# Patient Record
Sex: FEMALE | Race: WHITE | NOT HISPANIC OR LATINO | Employment: OTHER | URBAN - METROPOLITAN AREA
[De-identification: names, ages, dates, MRNs, and addresses within clinical notes are randomized per-mention and may not be internally consistent; named-entity substitution may affect disease eponyms.]

---

## 2017-04-18 ENCOUNTER — OFFICE VISIT (OUTPATIENT)
Dept: FAMILY MEDICINE CLINIC | Facility: CLINIC | Age: 57
End: 2017-04-18

## 2017-04-18 VITALS
WEIGHT: 293 LBS | TEMPERATURE: 98.9 F | HEART RATE: 73 BPM | HEIGHT: 67 IN | OXYGEN SATURATION: 98 % | DIASTOLIC BLOOD PRESSURE: 80 MMHG | SYSTOLIC BLOOD PRESSURE: 130 MMHG | BODY MASS INDEX: 45.99 KG/M2

## 2017-04-18 DIAGNOSIS — E78.5 BORDERLINE HYPERLIPIDEMIA: ICD-10-CM

## 2017-04-18 DIAGNOSIS — Z91.09 ENVIRONMENTAL ALLERGIES: ICD-10-CM

## 2017-04-18 DIAGNOSIS — Z12.11 COLON CANCER SCREENING: Primary | ICD-10-CM

## 2017-04-18 DIAGNOSIS — I10 ESSENTIAL HYPERTENSION: ICD-10-CM

## 2017-04-18 DIAGNOSIS — Z11.59 NEED FOR HEPATITIS C SCREENING TEST: ICD-10-CM

## 2017-04-18 DIAGNOSIS — R73.03 PREDIABETES: ICD-10-CM

## 2017-04-18 PROCEDURE — 99214 OFFICE O/P EST MOD 30 MIN: CPT | Performed by: NURSE PRACTITIONER

## 2017-04-18 RX ORDER — LISINOPRIL AND HYDROCHLOROTHIAZIDE 20; 12.5 MG/1; MG/1
1 TABLET ORAL 2 TIMES DAILY
Qty: 360 TABLET | Refills: 3 | Status: SHIPPED | OUTPATIENT
Start: 2017-04-18 | End: 2018-01-04 | Stop reason: SDUPTHER

## 2017-04-18 RX ORDER — DESLORATADINE 5 MG/1
5 TABLET ORAL DAILY
Qty: 90 TABLET | Refills: 3 | Status: SHIPPED | OUTPATIENT
Start: 2017-04-18 | End: 2018-01-04 | Stop reason: SDUPTHER

## 2017-04-18 RX ORDER — MONTELUKAST SODIUM 10 MG/1
10 TABLET ORAL DAILY
Qty: 90 TABLET | Refills: 3 | Status: SHIPPED | OUTPATIENT
Start: 2017-04-18 | End: 2018-01-04 | Stop reason: SDUPTHER

## 2017-04-18 RX ORDER — FLUTICASONE PROPIONATE 50 MCG
1 SPRAY, SUSPENSION (ML) NASAL DAILY
Qty: 3 EACH | Refills: 3 | Status: SHIPPED | OUTPATIENT
Start: 2017-04-18 | End: 2018-01-04 | Stop reason: SDUPTHER

## 2017-04-18 NOTE — PROGRESS NOTES
"Subjective   Jany Harris is a 56 y.o. female.     History of Present Illness   Jany Harris 56 y.o. female who presents today for routine follow up check and medication refills.  she has a history of   Patient Active Problem List   Diagnosis   • Renal failure   • Allergic rhinitis   • Anemia due to chronic kidney disease   • Edema   • Cardiac murmur   • Essential hypertension   • Borderline hyperlipidemia   • Morbid obesity with BMI of 50.0-59.9, adult   • Prediabetes   • Heart disease, rheumatic   • Vitamin D deficiency   • Environmental allergies   • GERD (gastroesophageal reflux disease)   • Bilateral knee pain   • Obesity   • Colon cancer screening   • Need for hepatitis C screening test   .  Since the last visit, she has overall felt well.  She has Hypertenision and is well controlled on medication, Hyperlipidemia and here to discuss treatment and prediabetes and here for labs, allergies that are well controled with meds.  she has been compliant with current medications have reviewed them.  The patient denies medication side effects.        The following portions of the patient's history were reviewed and updated as appropriate: allergies, current medications, past social history and problem list.    Review of Systems   All other systems reviewed and are negative.      Objective   /80 (BP Location: Right arm, Patient Position: Sitting)  Pulse 73  Temp 98.9 °F (37.2 °C)  Ht 66.5\" (168.9 cm)  Wt (!) 344 lb 6.4 oz (156 kg)  SpO2 98%  BMI 54.75 kg/m2  Physical Exam   Constitutional: She is oriented to person, place, and time. Vital signs are normal. She appears well-developed and well-nourished. No distress.   HENT:   Head: Normocephalic.   Cardiovascular: Normal rate, regular rhythm and normal heart sounds.    Pulmonary/Chest: Effort normal and breath sounds normal.   Neurological: She is alert and oriented to person, place, and time. Gait normal.   Psychiatric: She has a normal mood and affect. Her " behavior is normal. Judgment and thought content normal.   Vitals reviewed.      Assessment/Plan   Problem List Items Addressed This Visit        Cardiovascular and Mediastinum    Essential hypertension    Relevant Medications    lisinopril-hydrochlorothiazide (PRINZIDE,ZESTORETIC) 20-12.5 MG per tablet    Borderline hyperlipidemia    Relevant Orders    Lipid Panel With LDL / HDL Ratio       Other    Prediabetes    Relevant Orders    Hemoglobin A1c    Environmental allergies    Relevant Medications    desloratadine (CLARINEX) 5 MG tablet    montelukast (SINGULAIR) 10 MG tablet    fluticasone (FLONASE) 50 MCG/ACT nasal spray    Colon cancer screening - Primary    Relevant Orders    Ambulatory Referral For Screening Colonoscopy    Need for hepatitis C screening test    Relevant Orders    Hepatitis C Antibody        Follow up after labs

## 2017-04-19 LAB
CHOLEST SERPL-MCNC: 240 MG/DL (ref 0–200)
HBA1C MFR BLD: 5.87 % (ref 4.8–5.6)
HCV AB S/CO SERPL IA: <0.1 S/CO RATIO (ref 0–0.9)
HDLC SERPL-MCNC: 51 MG/DL (ref 40–60)
LDLC SERPL CALC-MCNC: 157 MG/DL (ref 0–100)
LDLC/HDLC SERPL: 3.09 {RATIO}
TRIGL SERPL-MCNC: 158 MG/DL (ref 0–150)
VLDLC SERPL CALC-MCNC: 31.6 MG/DL (ref 5–40)

## 2017-04-24 RX ORDER — ATORVASTATIN CALCIUM 20 MG/1
20 TABLET, FILM COATED ORAL DAILY
Qty: 30 TABLET | Refills: 3 | Status: SHIPPED | OUTPATIENT
Start: 2017-04-24 | End: 2017-08-21 | Stop reason: SDUPTHER

## 2017-07-17 ENCOUNTER — OFFICE VISIT (OUTPATIENT)
Dept: FAMILY MEDICINE CLINIC | Facility: CLINIC | Age: 57
End: 2017-07-17

## 2017-07-17 VITALS
DIASTOLIC BLOOD PRESSURE: 74 MMHG | SYSTOLIC BLOOD PRESSURE: 130 MMHG | OXYGEN SATURATION: 98 % | BODY MASS INDEX: 45.99 KG/M2 | HEART RATE: 56 BPM | TEMPERATURE: 98.4 F | WEIGHT: 293 LBS | HEIGHT: 67 IN

## 2017-07-17 DIAGNOSIS — E78.5 HYPERLIPIDEMIA, UNSPECIFIED HYPERLIPIDEMIA TYPE: ICD-10-CM

## 2017-07-17 DIAGNOSIS — R73.03 PREDIABETES: ICD-10-CM

## 2017-07-17 DIAGNOSIS — E66.9 OBESITY, UNSPECIFIED OBESITY SEVERITY, UNSPECIFIED OBESITY TYPE: Primary | ICD-10-CM

## 2017-07-17 PROBLEM — Z12.11 COLON CANCER SCREENING: Status: RESOLVED | Noted: 2017-04-18 | Resolved: 2017-07-17

## 2017-07-17 PROBLEM — Z11.59 NEED FOR HEPATITIS C SCREENING TEST: Status: RESOLVED | Noted: 2017-04-18 | Resolved: 2017-07-17

## 2017-07-17 LAB
CHOLEST SERPL-MCNC: 184 MG/DL (ref 0–200)
HBA1C MFR BLD: 5.8 % (ref 4.8–5.6)
HDLC SERPL-MCNC: 51 MG/DL (ref 40–60)
LDLC SERPL CALC-MCNC: 104 MG/DL (ref 0–100)
LDLC/HDLC SERPL: 2.05 {RATIO}
TRIGL SERPL-MCNC: 143 MG/DL (ref 0–150)
VLDLC SERPL CALC-MCNC: 28.6 MG/DL (ref 5–40)

## 2017-07-17 PROCEDURE — 99214 OFFICE O/P EST MOD 30 MIN: CPT | Performed by: NURSE PRACTITIONER

## 2017-07-17 NOTE — PROGRESS NOTES
"Subjective   Jany Harris is a 56 y.o. female.     History of Present Illness   Patient presenting to the office today for repeat lab results.  3 months ago she started 8 atorvastatin 20 mg for an elevated cholesterol level and also her A1c had jumped up into the prediabetic range.  She has been taking the medication as directed without any side effects and doing quite well with it.  She is fasting today for repeat of his labs.  The following portions of the patient's history were reviewed and updated as appropriate: allergies, current medications, past social history and problem list.    Review of Systems   Constitutional: Negative for fever.   All other systems reviewed and are negative.      Objective   /74 (BP Location: Right arm, Patient Position: Sitting)  Pulse 56  Temp 98.4 °F (36.9 °C)  Ht 66.5\" (168.9 cm)  Wt (!) 348 lb 3.2 oz (158 kg)  SpO2 98%  BMI 55.36 kg/m2  Physical Exam   Constitutional: She is oriented to person, place, and time. Vital signs are normal. She appears well-developed and well-nourished. No distress.   HENT:   Head: Normocephalic.   Cardiovascular: Normal rate, regular rhythm and normal heart sounds.    Pulmonary/Chest: Effort normal and breath sounds normal.   Neurological: She is alert and oriented to person, place, and time. Gait normal.   Psychiatric: She has a normal mood and affect. Her behavior is normal. Judgment and thought content normal.   Vitals reviewed.      Assessment/Plan   Problem List Items Addressed This Visit        Cardiovascular and Mediastinum    Hyperlipidemia    Relevant Orders    Lipid Panel With LDL / HDL Ratio       Digestive    Obesity - Primary       Other    Prediabetes    Relevant Orders    Hemoglobin A1c        Follow up after labs      "

## 2017-08-21 RX ORDER — ATORVASTATIN CALCIUM 20 MG/1
TABLET, FILM COATED ORAL
Qty: 30 TABLET | Refills: 3 | Status: SHIPPED | OUTPATIENT
Start: 2017-08-21 | End: 2018-01-04 | Stop reason: SDUPTHER

## 2018-01-04 ENCOUNTER — OFFICE VISIT (OUTPATIENT)
Dept: FAMILY MEDICINE CLINIC | Facility: CLINIC | Age: 58
End: 2018-01-04

## 2018-01-04 VITALS
SYSTOLIC BLOOD PRESSURE: 156 MMHG | OXYGEN SATURATION: 98 % | HEART RATE: 88 BPM | DIASTOLIC BLOOD PRESSURE: 70 MMHG | HEIGHT: 66 IN | TEMPERATURE: 98.3 F

## 2018-01-04 DIAGNOSIS — E78.5 HYPERLIPIDEMIA, UNSPECIFIED HYPERLIPIDEMIA TYPE: ICD-10-CM

## 2018-01-04 DIAGNOSIS — Z12.39 BREAST CANCER SCREENING: ICD-10-CM

## 2018-01-04 DIAGNOSIS — D63.1 ANEMIA DUE TO CHRONIC KIDNEY DISEASE: ICD-10-CM

## 2018-01-04 DIAGNOSIS — N18.9 ANEMIA DUE TO CHRONIC KIDNEY DISEASE: ICD-10-CM

## 2018-01-04 DIAGNOSIS — I10 ESSENTIAL HYPERTENSION: ICD-10-CM

## 2018-01-04 DIAGNOSIS — Z91.09 ENVIRONMENTAL ALLERGIES: ICD-10-CM

## 2018-01-04 DIAGNOSIS — R73.03 PREDIABETES: Primary | ICD-10-CM

## 2018-01-04 PROCEDURE — 99214 OFFICE O/P EST MOD 30 MIN: CPT | Performed by: NURSE PRACTITIONER

## 2018-01-04 RX ORDER — ATORVASTATIN CALCIUM 20 MG/1
20 TABLET, FILM COATED ORAL DAILY
Qty: 90 TABLET | Refills: 3 | Status: SHIPPED | OUTPATIENT
Start: 2018-01-04

## 2018-01-04 RX ORDER — MONTELUKAST SODIUM 10 MG/1
10 TABLET ORAL DAILY
Qty: 90 TABLET | Refills: 3 | Status: SHIPPED | OUTPATIENT
Start: 2018-01-04

## 2018-01-04 RX ORDER — FLUTICASONE PROPIONATE 50 MCG
1 SPRAY, SUSPENSION (ML) NASAL DAILY
Qty: 3 BOTTLE | Refills: 3 | Status: SHIPPED | OUTPATIENT
Start: 2018-01-04

## 2018-01-04 RX ORDER — LISINOPRIL AND HYDROCHLOROTHIAZIDE 20; 12.5 MG/1; MG/1
2 TABLET ORAL DAILY
Qty: 360 TABLET | Refills: 3 | Status: SHIPPED | OUTPATIENT
Start: 2018-01-04

## 2018-01-04 RX ORDER — DESLORATADINE 5 MG/1
5 TABLET ORAL DAILY
Qty: 90 TABLET | Refills: 3 | Status: SHIPPED | OUTPATIENT
Start: 2018-01-04

## 2018-01-04 NOTE — PROGRESS NOTES
"Subjective   Jany Harris is a 57 y.o. female.     History of Present Illness   Jany Harris 57 y.o. female who presents today for routine follow up check and medication refills.  she has a history of   Patient Active Problem List   Diagnosis   • Renal failure   • Allergic rhinitis   • Anemia due to chronic kidney disease   • Cardiac murmur   • Essential hypertension   • Morbid obesity with BMI of 50.0-59.9, adult   • Prediabetes   • Heart disease, rheumatic   • Vitamin D deficiency   • Environmental allergies   • GERD (gastroesophageal reflux disease)   • Bilateral knee pain   • Obesity   • Hyperlipidemia   • Breast cancer screening   .  Since the last visit, she has overall felt well.  She has Hypertenision and is well controlled on medication and allergies are well controlled and needing a1c checked for prediabetes.  she has been compliant with current medications have reviewed them.  The patient denies medication side effects.    Results for orders placed or performed in visit on 07/17/17   Lipid Panel With LDL / HDL Ratio   Result Value Ref Range    Total Cholesterol 184 0 - 200 mg/dL    Triglycerides 143 0 - 150 mg/dL    HDL Cholesterol 51 40 - 60 mg/dL    VLDL Cholesterol 28.6 5 - 40 mg/dL    LDL Cholesterol  104 (H) 0 - 100 mg/dL    LDL/HDL Ratio 2.05    Hemoglobin A1c   Result Value Ref Range    Hemoglobin A1C 5.80 (H) 4.80 - 5.60 %         The following portions of the patient's history were reviewed and updated as appropriate: allergies, current medications, past social history and problem list.    Review of Systems   Constitutional: Negative for fever.   All other systems reviewed and are negative.      Objective   /70 (BP Location: Right arm, Patient Position: Sitting)  Pulse 88  Temp 98.3 °F (36.8 °C)  Ht 168.9 cm (66.5\")  SpO2 98%  Physical Exam   Constitutional: She is oriented to person, place, and time. Vital signs are normal. She appears well-developed and well-nourished. No distress. "   HENT:   Head: Normocephalic.   Cardiovascular: Normal rate, regular rhythm and normal heart sounds.    Pulmonary/Chest: Effort normal and breath sounds normal.   Neurological: She is alert and oriented to person, place, and time. Gait normal.   Psychiatric: She has a normal mood and affect. Her behavior is normal. Judgment and thought content normal.   Vitals reviewed.      Assessment/Plan   Problem List Items Addressed This Visit        Cardiovascular and Mediastinum    Essential hypertension    Relevant Medications    lisinopril-hydrochlorothiazide (PRINZIDE,ZESTORETIC) 20-12.5 MG per tablet    Other Relevant Orders    MicroAlbumin, Urine, Random - Urine, Clean Catch    Hyperlipidemia    Relevant Medications    atorvastatin (LIPITOR) 20 MG tablet    Other Relevant Orders    Comprehensive Metabolic Panel    Lipid Panel With LDL / HDL Ratio       Genitourinary    Anemia due to chronic kidney disease    Relevant Orders    CBC & Differential       Other    Prediabetes - Primary    Relevant Orders    Hemoglobin A1c    Environmental allergies    Relevant Medications    desloratadine (CLARINEX) 5 MG tablet    montelukast (SINGULAIR) 10 MG tablet    fluticasone (FLONASE) 50 MCG/ACT nasal spray    Breast cancer screening    Relevant Orders    Mammo Screening Bilateral With CAD        Follow up after labs   Cont same with meds   Discussed cologuard-pt will call if insurance covers it

## 2018-01-05 LAB
ALBUMIN SERPL-MCNC: 4 G/DL (ref 3.5–5.2)
ALBUMIN/GLOB SERPL: 1.4 G/DL
ALP SERPL-CCNC: 98 U/L (ref 39–117)
ALT SERPL-CCNC: 18 U/L (ref 1–33)
AST SERPL-CCNC: 14 U/L (ref 1–32)
BASOPHILS # BLD AUTO: 0.02 10*3/MM3 (ref 0–0.2)
BASOPHILS NFR BLD AUTO: 0.2 % (ref 0–1.5)
BILIRUB SERPL-MCNC: 0.3 MG/DL (ref 0.1–1.2)
BUN SERPL-MCNC: 21 MG/DL (ref 6–20)
BUN/CREAT SERPL: 21 (ref 7–25)
CALCIUM SERPL-MCNC: 9.4 MG/DL (ref 8.6–10.5)
CHLORIDE SERPL-SCNC: 101 MMOL/L (ref 98–107)
CHOLEST SERPL-MCNC: 189 MG/DL (ref 0–200)
CO2 SERPL-SCNC: 31.2 MMOL/L (ref 22–29)
CREAT SERPL-MCNC: 1 MG/DL (ref 0.57–1)
EOSINOPHIL # BLD AUTO: 0.65 10*3/MM3 (ref 0–0.7)
EOSINOPHIL NFR BLD AUTO: 7.2 % (ref 0.3–6.2)
ERYTHROCYTE [DISTWIDTH] IN BLOOD BY AUTOMATED COUNT: 14.8 % (ref 11.7–13)
GLOBULIN SER CALC-MCNC: 2.9 GM/DL
GLUCOSE SERPL-MCNC: 114 MG/DL (ref 65–99)
HBA1C MFR BLD: 6.16 % (ref 4.8–5.6)
HCT VFR BLD AUTO: 40.8 % (ref 35.6–45.5)
HDLC SERPL-MCNC: 55 MG/DL (ref 40–60)
HGB BLD-MCNC: 12.3 G/DL (ref 11.9–15.5)
IMM GRANULOCYTES # BLD: 0 10*3/MM3 (ref 0–0.03)
IMM GRANULOCYTES NFR BLD: 0 % (ref 0–0.5)
LDLC SERPL CALC-MCNC: 108 MG/DL (ref 0–100)
LDLC/HDLC SERPL: 1.96 {RATIO}
LYMPHOCYTES # BLD AUTO: 1.81 10*3/MM3 (ref 0.9–4.8)
LYMPHOCYTES NFR BLD AUTO: 19.9 % (ref 19.6–45.3)
MCH RBC QN AUTO: 28.5 PG (ref 26.9–32)
MCHC RBC AUTO-ENTMCNC: 30.1 G/DL (ref 32.4–36.3)
MCV RBC AUTO: 94.4 FL (ref 80.5–98.2)
MICROALBUMIN UR-MCNC: 45.7 UG/ML
MONOCYTES # BLD AUTO: 0.57 10*3/MM3 (ref 0.2–1.2)
MONOCYTES NFR BLD AUTO: 6.3 % (ref 5–12)
NEUTROPHILS # BLD AUTO: 6.03 10*3/MM3 (ref 1.9–8.1)
NEUTROPHILS NFR BLD AUTO: 66.4 % (ref 42.7–76)
PLATELET # BLD AUTO: 283 10*3/MM3 (ref 140–500)
POTASSIUM SERPL-SCNC: 4.9 MMOL/L (ref 3.5–5.2)
PROT SERPL-MCNC: 6.9 G/DL (ref 6–8.5)
RBC # BLD AUTO: 4.32 10*6/MM3 (ref 3.9–5.2)
SODIUM SERPL-SCNC: 141 MMOL/L (ref 136–145)
TRIGL SERPL-MCNC: 131 MG/DL (ref 0–150)
VLDLC SERPL CALC-MCNC: 26.2 MG/DL (ref 5–40)
WBC # BLD AUTO: 9.08 10*3/MM3 (ref 4.5–10.7)

## 2018-01-10 ENCOUNTER — TELEPHONE (OUTPATIENT)
Dept: FAMILY MEDICINE CLINIC | Facility: CLINIC | Age: 58
End: 2018-01-10

## 2018-01-10 NOTE — TELEPHONE ENCOUNTER
Patient left message stating she checked with her insurance and they will cover the cologaurd so she wants that ordered. She said she was prescribed metformin but has issues swallowing large pills. She is wanting a different medication called in that is a smaller pill. Patient is requesting a zpack for a sinus infection

## 2018-01-11 RX ORDER — AZITHROMYCIN 250 MG/1
TABLET, FILM COATED ORAL
Qty: 6 TABLET | Refills: 0 | Status: SHIPPED | OUTPATIENT
Start: 2018-01-11 | End: 2018-03-08

## 2018-01-16 ENCOUNTER — HOSPITAL ENCOUNTER (OUTPATIENT)
Dept: MAMMOGRAPHY | Facility: HOSPITAL | Age: 58
Discharge: HOME | End: 2018-01-16
Admitting: NURSE PRACTITIONER

## 2018-01-16 DIAGNOSIS — Z12.39 BREAST CANCER SCREENING: ICD-10-CM

## 2018-01-16 PROCEDURE — 77067 SCR MAMMO BI INCL CAD: CPT

## 2018-02-05 ENCOUNTER — TELEPHONE (OUTPATIENT)
Dept: FAMILY MEDICINE CLINIC | Facility: CLINIC | Age: 58
End: 2018-02-05

## 2018-02-05 DIAGNOSIS — Z12.11 COLON CANCER SCREENING: Primary | ICD-10-CM

## 2018-02-05 NOTE — TELEPHONE ENCOUNTER
Patient states that she was told to call her insurance company to see if they would cover the Cologuard test and they do. Does patient need to come in a fill out form?

## 2018-02-07 ENCOUNTER — RESULTS ENCOUNTER (OUTPATIENT)
Dept: FAMILY MEDICINE CLINIC | Facility: CLINIC | Age: 58
End: 2018-02-07

## 2018-02-07 DIAGNOSIS — Z12.11 COLON CANCER SCREENING: ICD-10-CM

## 2018-03-08 ENCOUNTER — OFFICE VISIT (OUTPATIENT)
Dept: FAMILY MEDICINE CLINIC | Facility: CLINIC | Age: 58
End: 2018-03-08

## 2018-03-08 VITALS
HEART RATE: 72 BPM | SYSTOLIC BLOOD PRESSURE: 140 MMHG | TEMPERATURE: 98.2 F | HEIGHT: 66 IN | DIASTOLIC BLOOD PRESSURE: 70 MMHG | OXYGEN SATURATION: 98 %

## 2018-03-08 DIAGNOSIS — R30.0 DYSURIA: Primary | ICD-10-CM

## 2018-03-08 DIAGNOSIS — R35.0 URINARY FREQUENCY: ICD-10-CM

## 2018-03-08 DIAGNOSIS — N39.0 URINARY TRACT INFECTION WITHOUT HEMATURIA, SITE UNSPECIFIED: ICD-10-CM

## 2018-03-08 LAB
BILIRUB BLD-MCNC: NEGATIVE MG/DL
CLARITY, POC: CLEAR
COLOR UR: YELLOW
GLUCOSE UR STRIP-MCNC: NEGATIVE MG/DL
KETONES UR QL: NEGATIVE
LEUKOCYTE EST, POC: ABNORMAL
NITRITE UR-MCNC: NEGATIVE MG/ML
PH UR: 5.5 [PH] (ref 5–8)
PROT UR STRIP-MCNC: NEGATIVE MG/DL
RBC # UR STRIP: ABNORMAL /UL
SP GR UR: 1.02 (ref 1–1.03)
UROBILINOGEN UR QL: NORMAL

## 2018-03-08 PROCEDURE — 99213 OFFICE O/P EST LOW 20 MIN: CPT | Performed by: NURSE PRACTITIONER

## 2018-03-08 PROCEDURE — 81003 URINALYSIS AUTO W/O SCOPE: CPT | Performed by: NURSE PRACTITIONER

## 2018-03-08 RX ORDER — CIPROFLOXACIN 500 MG/1
500 TABLET, FILM COATED ORAL EVERY 12 HOURS SCHEDULED
Qty: 14 TABLET | Refills: 0 | Status: SHIPPED | OUTPATIENT
Start: 2018-03-08 | End: 2018-03-16

## 2018-03-08 NOTE — PROGRESS NOTES
"Subjective   Jany Harris is a 57 y.o. female.     History of Present Illness   Urinary Tract Infection: Patient complains of burning with urination She has had symptoms for 7 days. Patient also complains of back pain. Patient denies fever. Patient does not have a history of recurrent UTI.  Patient does not have a history of pyelonephritis.   The following portions of the patient's history were reviewed and updated as appropriate: allergies, current medications, past social history and problem list.    Review of Systems   Genitourinary: Positive for dysuria and frequency.   All other systems reviewed and are negative.      Objective   /70 (BP Location: Right arm, Patient Position: Sitting)  Pulse 72  Temp 98.2 °F (36.8 °C)  Ht 168.9 cm (66.5\")  SpO2 98%  Physical Exam   Constitutional: She is oriented to person, place, and time. Vital signs are normal. She appears well-developed and well-nourished. No distress.   HENT:   Head: Normocephalic.   Cardiovascular: Normal rate, regular rhythm and normal heart sounds.    Pulmonary/Chest: Effort normal and breath sounds normal.   Neurological: She is alert and oriented to person, place, and time. Gait normal.   Psychiatric: She has a normal mood and affect. Her behavior is normal. Judgment and thought content normal.   Vitals reviewed.      Assessment/Plan   Problem List Items Addressed This Visit        Nervous and Auditory    Dysuria - Primary    Relevant Orders    POC Urinalysis Dipstick, Automated       Genitourinary    Urinary frequency    Relevant Orders    POC Urinalysis Dipstick, Automated    Urinary tract infection without hematuria    Relevant Medications    ciprofloxacin (CIPRO) 500 MG tablet        rto prn     "

## 2018-03-16 ENCOUNTER — OFFICE VISIT (OUTPATIENT)
Dept: OBSTETRICS AND GYNECOLOGY | Age: 58
End: 2018-03-16

## 2018-03-16 VITALS
DIASTOLIC BLOOD PRESSURE: 92 MMHG | HEIGHT: 67 IN | SYSTOLIC BLOOD PRESSURE: 142 MMHG | WEIGHT: 293 LBS | BODY MASS INDEX: 45.99 KG/M2

## 2018-03-16 DIAGNOSIS — N76.0 ACUTE VAGINITIS: ICD-10-CM

## 2018-03-16 DIAGNOSIS — R31.9 HEMATURIA, UNSPECIFIED TYPE: ICD-10-CM

## 2018-03-16 DIAGNOSIS — R39.9 UTI SYMPTOMS: Primary | ICD-10-CM

## 2018-03-16 LAB
BILIRUB BLD-MCNC: NEGATIVE MG/DL
CLARITY, POC: CLEAR
COLOR UR: YELLOW
GLUCOSE UR STRIP-MCNC: NEGATIVE MG/DL
KETONES UR QL: NEGATIVE
LEUKOCYTE EST, POC: NEGATIVE
NITRITE UR-MCNC: NEGATIVE MG/ML
PH UR: 5.5 [PH] (ref 5–8)
PROT UR STRIP-MCNC: NEGATIVE MG/DL
RBC # UR STRIP: ABNORMAL /UL
SP GR UR: 1.02 (ref 1–1.03)
UROBILINOGEN UR QL: NORMAL

## 2018-03-16 PROCEDURE — 99203 OFFICE O/P NEW LOW 30 MIN: CPT | Performed by: PHYSICIAN ASSISTANT

## 2018-03-16 PROCEDURE — 81002 URINALYSIS NONAUTO W/O SCOPE: CPT | Performed by: PHYSICIAN ASSISTANT

## 2018-03-16 RX ORDER — FLUCONAZOLE 150 MG/1
150 TABLET ORAL ONCE
Qty: 2 TABLET | Refills: 0 | Status: SHIPPED | OUTPATIENT
Start: 2018-03-16 | End: 2018-03-16

## 2018-03-16 RX ORDER — NYSTATIN AND TRIAMCINOLONE ACETONIDE 100000; 1 [USP'U]/G; MG/G
OINTMENT TOPICAL 2 TIMES DAILY
Qty: 15 G | Refills: 1 | Status: SHIPPED | OUTPATIENT
Start: 2018-03-16 | End: 2018-05-10

## 2018-03-16 RX ORDER — NITROFURANTOIN 25; 75 MG/1; MG/1
100 CAPSULE ORAL 2 TIMES DAILY
Qty: 14 CAPSULE | Refills: 0 | Status: SHIPPED | OUTPATIENT
Start: 2018-03-16 | End: 2018-03-23

## 2018-03-16 NOTE — PROGRESS NOTES
"Subjective     Chief Complaint   Patient presents with   • Urinary Tract Infection     new pt c/o uti       Jany Harris is a 57 y.o. No obstetric history on file. whose LMP is No LMP recorded. Patient is postmenopausal. presents with UTI sx's. Also noted blood tinged d/c. Was unsure if it was vaginal or bladder.  She was seen by her PCP and was dxed with a UTI. They gave her cipro. She completed the dose on Wednesday but still has urge, right flank pain and vaginal burning. Still notes some bloody d/t as well. She is not prone to these sorts of infections.  She is MP. Dxed 3 ya.      She was a pt of Dr cook. Last seen 3 ya    No Additional Complaints Reported    The following portions of the patient's history were reviewed and updated as appropriate:vital signs, allergies, current medications, past family history, past medical history, past social history, past surgical history and problem list      Review of Systems   A comprehensive review of systems was negative except for: Genitourinary: positive for vaginal discharge and urgency, flank pain and UTI sx's     Objective      /92   Ht 170.2 cm (67\")   Wt (!) 165 kg (363 lb)   BMI 56.85 kg/m²     Physical Exam    General:   alert, comfortable and no distress   Heart: Not performed today   Lungs: Not performed today.   Breast: Not performed today   Neck: na   Abdomen: {Not performed today   CVA: Not performed today   Pelvis: External genitalia: normal general appearance, thick, white d/c noted on external labia, thin clear/brown d/c noted at introitus  Vaginal: discharge, white  Cervix: wnl, no obvious bleeding from cervix   Extremities: Not performed today   Neurologic: negative   Psychiatric: Normal affect, judgement, and mood       Lab Review   Labs: Urinalysis - with micro     Imaging   No data reviewed    Assessment/Plan     ASSESSMENT  1. UTI symptoms    2. Acute vaginitis        PLAN  1.   Orders Placed This Encounter   Procedures   • Urine " Culture - Urine, Urine, Clean Catch   • NuSwab BV & Candida - Swab, Vagina   • POC Urinalysis Dipstick, Multipro       2. Medications prescribed this encounter:        New Medications Ordered This Visit   Medications   • nitrofurantoin, macrocrystal-monohydrate, (MACROBID) 100 MG capsule     Sig: Take 1 capsule by mouth 2 (Two) Times a Day for 7 days.     Dispense:  14 capsule     Refill:  0   • fluconazole (DIFLUCAN) 150 MG tablet     Sig: Take 1 tablet by mouth 1 (One) Time for 1 dose.     Dispense:  2 tablet     Refill:  0   • nystatin-triamcinolone (MYCOLOG) 085906-6.1 UNIT/GM-% ointment     Sig: Apply  topically 2 (Two) Times a Day.     Dispense:  15 g     Refill:  1       3. Start meds for suspected external fungal infection.  Also gave diflucan.  She still has blood in her urine so plan culture and start antibiotics.  Disc possibiilty of kidney stones as well and encouraged increased hydration.  Also sent in topical treatment as well.      4. As for the bloody d/c, I would suggest an u/s to further evaluate and can plan a well women exam as well    Follow up: 4 week(s)    SALIMA Krause  3/16/2018

## 2018-03-18 LAB
BACTERIA UR CULT: NORMAL
BACTERIA UR CULT: NORMAL

## 2018-03-19 ENCOUNTER — TELEPHONE (OUTPATIENT)
Dept: OBSTETRICS AND GYNECOLOGY | Age: 58
End: 2018-03-19

## 2018-03-19 NOTE — TELEPHONE ENCOUNTER
----- Message from SALIMA Sauceda sent at 3/19/2018  9:06 AM EDT -----  Let her know her urine culture is negative for infection. She can complete the antibiotic if she feels it is helping with her sx's

## 2018-03-21 LAB
A VAGINAE DNA VAG QL NAA+PROBE: ABNORMAL SCORE
BVAB2 DNA VAG QL NAA+PROBE: ABNORMAL SCORE
C ALBICANS DNA VAG QL NAA+PROBE: NEGATIVE
C GLABRATA DNA VAG QL NAA+PROBE: NEGATIVE
MEGA1 DNA VAG QL NAA+PROBE: ABNORMAL SCORE

## 2018-03-21 RX ORDER — METRONIDAZOLE 500 MG/1
500 TABLET ORAL 2 TIMES DAILY
Qty: 14 TABLET | Refills: 0 | Status: SHIPPED | OUTPATIENT
Start: 2018-03-21 | End: 2018-03-28

## 2018-04-16 ENCOUNTER — PROCEDURE VISIT (OUTPATIENT)
Dept: OBSTETRICS AND GYNECOLOGY | Age: 58
End: 2018-04-16

## 2018-04-16 ENCOUNTER — OFFICE VISIT (OUTPATIENT)
Dept: OBSTETRICS AND GYNECOLOGY | Age: 58
End: 2018-04-16

## 2018-04-16 VITALS
HEIGHT: 67 IN | WEIGHT: 293 LBS | BODY MASS INDEX: 45.99 KG/M2 | DIASTOLIC BLOOD PRESSURE: 80 MMHG | SYSTOLIC BLOOD PRESSURE: 158 MMHG

## 2018-04-16 DIAGNOSIS — N95.0 PMB (POSTMENOPAUSAL BLEEDING): Primary | ICD-10-CM

## 2018-04-16 DIAGNOSIS — N95.0 POSTMENOPAUSAL BLEEDING: ICD-10-CM

## 2018-04-16 DIAGNOSIS — Z12.4 SCREENING FOR CERVICAL CANCER: ICD-10-CM

## 2018-04-16 DIAGNOSIS — Z01.419 ENCOUNTER FOR GYNECOLOGICAL EXAMINATION WITHOUT ABNORMAL FINDING: Primary | ICD-10-CM

## 2018-04-16 DIAGNOSIS — R10.2 PELVIC PAIN IN FEMALE: ICD-10-CM

## 2018-04-16 PROCEDURE — 76830 TRANSVAGINAL US NON-OB: CPT | Performed by: OBSTETRICS & GYNECOLOGY

## 2018-04-16 PROCEDURE — 99213 OFFICE O/P EST LOW 20 MIN: CPT | Performed by: OBSTETRICS & GYNECOLOGY

## 2018-04-16 PROCEDURE — 99396 PREV VISIT EST AGE 40-64: CPT | Performed by: OBSTETRICS & GYNECOLOGY

## 2018-04-16 NOTE — PROGRESS NOTES
Routine Annual Visit    2018    Patient: Jany Harris          MR#:[id number]      Chief Complaint   Patient presents with   • Annual Exam     PT HERE FOR ROUTINE AE ALONG WITH U/S DUE TO PAIN AND SOME SPOTTING. NO PAP ON FILE.       History of Present Illness    57 y.o. female  who presents for annual exam.  New problem to me of pink tinged discharge  Pelvic pain actually gone  No cramping  Due for pap  cologard neg - will need repeat in 3 years  - mammo  UTI and BV recently treated, all that is left is this pink discharge  Retired x 3 months and plans to move [place]            No LMP recorded. Patient is postmenopausal.  Obstetric History:  OB History      Para Term  AB Living    0 0 0 0 0 0    SAB TAB Ectopic Molar Multiple Live Births    0 0 0 0 0 0         Menstrual History:     No LMP recorded. Patient is postmenopausal.       Sexual History:       ________________________________________  Patient Active Problem List   Diagnosis   • Renal failure   • Allergic rhinitis   • Anemia due to chronic kidney disease   • Cardiac murmur   • Essential hypertension   • Morbid obesity with BMI of 50.0-59.9, adult   • Prediabetes   • Heart disease, rheumatic   • Vitamin D deficiency   • Environmental allergies   • GERD (gastroesophageal reflux disease)   • Bilateral knee pain   • Obesity   • Hyperlipidemia   • Breast cancer screening   • Dysuria   • Urinary frequency   • Urinary tract infection without hematuria   • Postmenopausal bleeding       Past Medical History:   Diagnosis Date   • Carpal tunnel syndrome    • Edema    • Fatigue    • Heartburn    • Hyperlipidemia    • Hypertension    • Irregular heart beat    • Joint pain    • Plantar fasciitis    • Postmenopausal    • Rheumatic fever     @9- had ECHO that was ok in the past few years       Past Surgical History:   Procedure Laterality Date   • NASAL POLYP EXCISION      no general anesthesia    • TONSILLECTOMY    "      History   Smoking Status   • Never Smoker   Smokeless Tobacco   • Never Used       has a current medication list which includes the following prescription(s): atorvastatin, calcium carbonate antacid, calcium carbonate-vitamin d, desloratadine, fluticasone, lisinopril-hydrochlorothiazide, metformin, montelukast, multiple vitamin, and nystatin-triamcinolone.  ________________________________________    Current contraception: post menopausal status  History of abnormal Pap smear: no  Family history of Breast cancer: no  Family history of uterine or ovarian cancer: no  Family History of colon cancer/colon polyps: no  History of abnormal mammogram: no      The following portions of the patient's history were reviewed and updated as appropriate: allergies, current medications, past family history, past medical history, past social history, past surgical history and problem list.    Review of Systems    Pertinent items are noted in HPI.     Objective   Physical Exam    /80   Ht 170.2 cm (67\")   Wt (!) 164 kg (361 lb)   BMI 56.54 kg/m²    BP Readings from Last 3 Encounters:   04/16/18 158/80   03/16/18 142/92   03/08/18 140/70      Wt Readings from Last 3 Encounters:   04/16/18 (!) 164 kg (361 lb)   03/16/18 (!) 165 kg (363 lb)   07/17/17 (!) 158 kg (348 lb 3.2 oz)      BMI: Estimated body mass index is 56.54 kg/m² as calculated from the following:    Height as of this encounter: 170.2 cm (67\").    Weight as of this encounter: 164 kg (361 lb).      General:   alert, appears stated age and cooperative   Abdomen: soft, non-tender, without masses or organomegaly   Breast: inspection negative, no nipple discharge or bleeding, no masses or nodularity palpable   Vulva: normal   Vagina: normal mucosa   Cervix: no bleeding following Pap, no cervical motion tenderness and no lesions   Uterus: normal size, mobile or non-tender   Adnexa: no mass, fullness, tenderness     US done- lining is 17 mm!- doesn't appear to be " heterogenous, ovaries are normal, 2 small fibroids- 2 cm    Attempted endometrial biopsy  Extremely difficult exam and cervical stenosis  Unable to accomplish in office    Assessment:    1. Normal annual exam   Assessment     ICD-10-CM ICD-9-CM   1. Encounter for gynecological examination without abnormal finding Z01.419 V72.31   2. Pelvic pain in female R10.2 625.9   3. Screening for cervical cancer Z12.4 V76.2   4. Postmenopausal bleeding N95.0 627.1     Plan:    Plan     []  Mammogram request made  [x]  PAP done  []  Labs:   []  GC/Chl/TV  []  DEXA scan   []  Referral for colonoscopy:       Jany was seen today for annual exam.    Diagnoses and all orders for this visit:    Encounter for gynecological examination without abnormal finding  -     IGP, Aptima HPV, Rfx 16 / 18,45    Pelvic pain in female  -     IGP, Aptima HPV, Rfx 16 / 18,45    Screening for cervical cancer  -     IGP, Aptima HPV, Rfx 16 / 18,45    Postmenopausal bleeding  -     Case Request; Standing  -     Case Request    Other orders  -     Follow Anesthesia Guidelines / Standing Orders; Future  -     Follow Anesthesia Guidelines / Standing Orders; Standing  -     Obtain Informed Consent; Standing      See US- 17 mm lining- differential include polyp, endometrial hyperplasia and endometrial cancer  Unable to do office biopsy  Premarin qhs prior to surgery x 3 nights to help with access    Counseling:  --Nutrition: Stressed importance of moderation and caloric balance, stressed fresh fruit and vegetables  --Exercise: Stressed the importance of regular exercise. 3-5 times weekly   - Discussed screening mammogram recommendations.   --Discussed benefits of screening colonoscopy- age 50 unless FH  --Discussed pap smear screening recommendations

## 2018-04-18 LAB
CYTOLOGIST CVX/VAG CYTO: NORMAL
CYTOLOGY CVX/VAG DOC THIN PREP: NORMAL
DX ICD CODE: NORMAL
HIV 1 & 2 AB SER-IMP: NORMAL
HPV I/H RISK 4 DNA CVX QL PROBE+SIG AMP: NEGATIVE
OTHER STN SPEC: NORMAL
PATH REPORT.FINAL DX SPEC: NORMAL
STAT OF ADQ CVX/VAG CYTO-IMP: NORMAL

## 2018-05-10 ENCOUNTER — APPOINTMENT (OUTPATIENT)
Dept: PREADMISSION TESTING | Facility: HOSPITAL | Age: 58
End: 2018-05-10

## 2018-05-10 VITALS
WEIGHT: 293 LBS | BODY MASS INDEX: 45.99 KG/M2 | OXYGEN SATURATION: 98 % | HEART RATE: 79 BPM | HEIGHT: 67 IN | DIASTOLIC BLOOD PRESSURE: 77 MMHG | RESPIRATION RATE: 20 BRPM | SYSTOLIC BLOOD PRESSURE: 116 MMHG | TEMPERATURE: 98.2 F

## 2018-05-10 LAB
25(OH)D3 SERPL-MCNC: 31.9 NG/ML (ref 30–100)
ANION GAP SERPL CALCULATED.3IONS-SCNC: 13.6 MMOL/L
BACTERIA UR QL AUTO: ABNORMAL /HPF
BASOPHILS # BLD AUTO: 0.04 10*3/MM3 (ref 0–0.2)
BASOPHILS NFR BLD AUTO: 0.4 % (ref 0–1.5)
BILIRUB UR QL STRIP: NEGATIVE
BUN BLD-MCNC: 30 MG/DL (ref 6–20)
BUN/CREAT SERPL: 25.4 (ref 7–25)
CALCIUM SPEC-SCNC: 10.2 MG/DL (ref 8.6–10.5)
CHLORIDE SERPL-SCNC: 103 MMOL/L (ref 98–107)
CLARITY UR: CLEAR
CO2 SERPL-SCNC: 25.4 MMOL/L (ref 22–29)
COLOR UR: YELLOW
CREAT BLD-MCNC: 1.18 MG/DL (ref 0.57–1)
DEPRECATED RDW RBC AUTO: 50.2 FL (ref 37–54)
EOSINOPHIL # BLD AUTO: 0.6 10*3/MM3 (ref 0–0.7)
EOSINOPHIL NFR BLD AUTO: 6.4 % (ref 0.3–6.2)
ERYTHROCYTE [DISTWIDTH] IN BLOOD BY AUTOMATED COUNT: 14.7 % (ref 11.7–13)
GFR SERPL CREATININE-BSD FRML MDRD: 47 ML/MIN/1.73
GLUCOSE BLD-MCNC: 118 MG/DL (ref 65–99)
GLUCOSE UR STRIP-MCNC: NEGATIVE MG/DL
HCT VFR BLD AUTO: 41.8 % (ref 35.6–45.5)
HGB BLD-MCNC: 12.7 G/DL (ref 11.9–15.5)
HGB UR QL STRIP.AUTO: NEGATIVE
HYALINE CASTS UR QL AUTO: ABNORMAL /LPF
IMM GRANULOCYTES # BLD: 0.02 10*3/MM3 (ref 0–0.03)
IMM GRANULOCYTES NFR BLD: 0.2 % (ref 0–0.5)
KETONES UR QL STRIP: NEGATIVE
LEUKOCYTE ESTERASE UR QL STRIP.AUTO: ABNORMAL
LYMPHOCYTES # BLD AUTO: 2.58 10*3/MM3 (ref 0.9–4.8)
LYMPHOCYTES NFR BLD AUTO: 27.5 % (ref 19.6–45.3)
MCH RBC QN AUTO: 28.3 PG (ref 26.9–32)
MCHC RBC AUTO-ENTMCNC: 30.4 G/DL (ref 32.4–36.3)
MCV RBC AUTO: 93.3 FL (ref 80.5–98.2)
MONOCYTES # BLD AUTO: 0.57 10*3/MM3 (ref 0.2–1.2)
MONOCYTES NFR BLD AUTO: 6.1 % (ref 5–12)
NEUTROPHILS # BLD AUTO: 5.58 10*3/MM3 (ref 1.9–8.1)
NEUTROPHILS NFR BLD AUTO: 59.4 % (ref 42.7–76)
NITRITE UR QL STRIP: NEGATIVE
PH UR STRIP.AUTO: <=5 [PH] (ref 5–8)
PLATELET # BLD AUTO: 262 10*3/MM3 (ref 140–500)
PMV BLD AUTO: 10.3 FL (ref 6–12)
POTASSIUM BLD-SCNC: 4.6 MMOL/L (ref 3.5–5.2)
PROT UR QL STRIP: NEGATIVE
RBC # BLD AUTO: 4.48 10*6/MM3 (ref 3.9–5.2)
RBC # UR: ABNORMAL /HPF
REF LAB TEST METHOD: ABNORMAL
SODIUM BLD-SCNC: 142 MMOL/L (ref 136–145)
SP GR UR STRIP: 1.02 (ref 1–1.03)
SQUAMOUS #/AREA URNS HPF: ABNORMAL /HPF
UROBILINOGEN UR QL STRIP: ABNORMAL
WBC NRBC COR # BLD: 9.39 10*3/MM3 (ref 4.5–10.7)
WBC UR QL AUTO: ABNORMAL /HPF

## 2018-05-10 PROCEDURE — 93010 ELECTROCARDIOGRAM REPORT: CPT | Performed by: INTERNAL MEDICINE

## 2018-05-10 PROCEDURE — 81001 URINALYSIS AUTO W/SCOPE: CPT | Performed by: INTERNAL MEDICINE

## 2018-05-10 PROCEDURE — 82306 VITAMIN D 25 HYDROXY: CPT | Performed by: OBSTETRICS & GYNECOLOGY

## 2018-05-10 PROCEDURE — 36415 COLL VENOUS BLD VENIPUNCTURE: CPT

## 2018-05-10 PROCEDURE — 93005 ELECTROCARDIOGRAM TRACING: CPT

## 2018-05-10 PROCEDURE — 80048 BASIC METABOLIC PNL TOTAL CA: CPT | Performed by: OBSTETRICS & GYNECOLOGY

## 2018-05-10 PROCEDURE — 85025 COMPLETE CBC W/AUTO DIFF WBC: CPT | Performed by: INTERNAL MEDICINE

## 2018-05-10 RX ORDER — IBUPROFEN 200 MG
200 TABLET ORAL EVERY 6 HOURS PRN
COMMUNITY

## 2018-05-10 NOTE — DISCHARGE INSTRUCTIONS
Take the following medications the morning of surgery with a small sip of water:        General Instructions:  • Do not eat solid food after midnight the night before surgery.  • You may drink clear liquids day of surgery but must stop at least one hour before your hospital arrival time.  • It is beneficial for you to have a clear drink that contains carbohydrates the day of surgery.  We suggest a 12 to 20 ounce bottle of Gatorade or Powerade for non-diabetic patients or a 12 to 20 ounce bottle of G2 or Powerade Zero for diabetic patients. (Pediatric patients, are not advised to drink a 12 to 20 ounce carbohydrate drink)    Clear liquids are liquids you can see through.  Nothing red in color.     Plain water                               Sports drinks  Sodas                                   Gelatin (Jell-O)  Fruit juices without pulp such as white grape juice and apple juice  Popsicles that contain no fruit or yogurt  Tea or coffee (no cream or milk added)  Gatorade / Powerade  G2 / Powerade Zero    • Infants may have breast milk up to four hours before surgery.  • Infants drinking formula may drink formula up to six hours before surgery.   • Patients who avoid smoking, chewing tobacco and alcohol for 4 weeks prior to surgery have a reduced risk of post-operative complications.  Quit smoking as many days before surgery as you can.  • Do not smoke, use chewing tobacco or drink alcohol the day of surgery.   • If applicable bring your C-PAP/ BI-PAP machine.  • Bring any papers given to you in the doctor’s office.  • Wear clean comfortable clothes and socks.  • Do not wear contact lenses or make-up.  Bring a case for your glasses.   • Bring crutches or walker if applicable.  • Remove all piercings.  Leave jewelry and any other valuables at home.  • Hair extensions with metal clips must be removed prior to surgery.  • The Pre-Admission Testing nurse will instruct you to bring medications if unable to obtain an accurate  list in Pre-Admission Testing.        If you were given a blood bank ID arm band remember to bring it with you the day of surgery.    Preventing a Surgical Site Infection:  • For 2 to 3 days before surgery, avoid shaving with a razor because the razor can irritate skin and make it easier to develop an infection.  • The night prior to surgery sleep in a clean bed with clean clothing.  Do not allow pets to sleep with you.  • Shower on the morning of surgery using a fresh bar of anti-bacterial soap (such as Dial) and clean washcloth.  Dry with a clean towel and dress in clean clothing.  • Ask your surgeon if you will be receiving antibiotics prior to surgery.  • Make sure you, your family, and all healthcare providers clean their hands with soap and water or an alcohol based hand  before caring for you or your wound.    Day of surgery:5/18/18   0530  Upon arrival, a Pre-op nurse and Anesthesiologist will review your health history, obtain vital signs, and answer questions you may have.  The only belongings needed at this time will be your home medications and if applicable your C-PAP/BI-PAP machine.  If you are staying overnight your family can leave the rest of your belongings in the car and bring them to your room later.  A Pre-op nurse will start an IV and you may receive medication in preparation for surgery, including something to help you relax.  Your family will be able to see you in the Pre-op area.  While you are in surgery your family should notify the waiting room  if they leave the waiting room area and provide a contact phone number.    Please be aware that surgery does come with discomfort.  We want to make every effort to control your discomfort so please discuss any uncontrolled symptoms with your nurse.   Your doctor will most likely have prescribed pain medications.      If you are going home after surgery you will receive individualized written care instructions before being  discharged.  A responsible adult must drive you to and from the hospital on the day of your surgery and stay with you for 24 hours.    If you are staying overnight following surgery, you will be transported to your hospital room following the recovery period.  [facility] has all private rooms.    If you have any questions please call Pre-Admission Testing at [phone].  Deductibles and co-payments are collected on the day of service. Please be prepared to pay the required co-pay, deductible or deposit on the day of service as defined by your plan.

## 2018-05-11 ENCOUNTER — TELEPHONE (OUTPATIENT)
Dept: OBSTETRICS AND GYNECOLOGY | Age: 58
End: 2018-05-11

## 2018-05-11 NOTE — TELEPHONE ENCOUNTER
Spoke with pt about EKG result, will confer with primary care and cardiology if necessary for surgery clearance.  No chest pain symptoms.

## 2018-05-14 DIAGNOSIS — R94.31 ABNORMAL EKG: Primary | ICD-10-CM

## 2018-05-15 ENCOUNTER — HOSPITAL ENCOUNTER (OUTPATIENT)
Dept: CARDIOLOGY | Facility: HOSPITAL | Age: 58
Discharge: HOME | End: 2018-05-15
Attending: INTERNAL MEDICINE | Admitting: INTERNAL MEDICINE

## 2018-05-15 ENCOUNTER — OFFICE VISIT (OUTPATIENT)
Dept: CARDIOLOGY | Facility: CLINIC | Age: 58
End: 2018-05-15

## 2018-05-15 VITALS
BODY MASS INDEX: 45.99 KG/M2 | DIASTOLIC BLOOD PRESSURE: 84 MMHG | HEART RATE: 76 BPM | SYSTOLIC BLOOD PRESSURE: 142 MMHG | HEIGHT: 67 IN | WEIGHT: 293 LBS

## 2018-05-15 DIAGNOSIS — Z01.818 PRE-OP EVALUATION: ICD-10-CM

## 2018-05-15 DIAGNOSIS — E78.5 HYPERLIPIDEMIA, UNSPECIFIED HYPERLIPIDEMIA TYPE: ICD-10-CM

## 2018-05-15 DIAGNOSIS — R94.31 ABNORMAL EKG: ICD-10-CM

## 2018-05-15 DIAGNOSIS — R01.1 CARDIAC MURMUR: ICD-10-CM

## 2018-05-15 DIAGNOSIS — I10 ESSENTIAL HYPERTENSION: Primary | ICD-10-CM

## 2018-05-15 DIAGNOSIS — E66.01 MORBID OBESITY WITH BMI OF 50.0-59.9, ADULT (HCC): ICD-10-CM

## 2018-05-15 PROCEDURE — 0399T ADULT TRANSTHORACIC ECHO COMPLETE W/ CONT IF NECESSARY PER PROTOCOL: CPT | Performed by: INTERNAL MEDICINE

## 2018-05-15 PROCEDURE — 0399T HC MYOCARDL STRAIN IMAG QUAN ASSMT PER SESS: CPT

## 2018-05-15 PROCEDURE — 93306 TTE W/DOPPLER COMPLETE: CPT

## 2018-05-15 PROCEDURE — 93000 ELECTROCARDIOGRAM COMPLETE: CPT | Performed by: INTERNAL MEDICINE

## 2018-05-15 PROCEDURE — 93306 TTE W/DOPPLER COMPLETE: CPT | Performed by: INTERNAL MEDICINE

## 2018-05-15 PROCEDURE — 99203 OFFICE O/P NEW LOW 30 MIN: CPT | Performed by: INTERNAL MEDICINE

## 2018-05-15 NOTE — PROGRESS NOTES
" Subjective:          Clearance for D  / C    ABNORMAL EKG    H/O HEART MURMUR, RHEUMATIC FEVER     Jany Harris is a 57 y.o. female who  Is here for the cardiac evaluation as the patient needs a clearance for the D&C and was found to have abnormal EKG as a preop, also has history of benign essential arterial hypertension, hyperlipidemia and the heart murmur along with a history of rheumatic fever early age    Denies any chest pains or tightness in chest no heaviness with a pressure sensation    Past Medical History:   Diagnosis Date   • Arthritis    • Carpal tunnel syndrome    • Edema    • Fatigue    • Heart murmur    • Heartburn    • Hyperlipidemia    • Hypertension    • Irregular heart beat    • Joint pain    • Plantar fasciitis    • Post-menopausal bleeding    • Postmenopausal    • Rheumatic fever     @9- had ECHO that was ok in the past few years   • UTI (urinary tract infection)     recent   • Vitamin D deficiency     reports that she has never smoked. She has never used smokeless tobacco. She reports that she drinks alcohol. She reports that she does not use drugs.  Family History   Problem Relation Age of Onset   • Obesity Father    • Heart attack Father    • Diabetes Brother    • Sleep apnea Brother    • Malig Hyperthermia Neg Hx    • Hypertension Neg Hx    • Hyperlipidemia Neg Hx    • Heart failure Neg Hx         Review of Systems  Constitutional: No wt loss, fever   Gastrointestinal: No nausea , abdominal pain  Behavioral/Psych: No insomnia or anxiety   Cardiovascular ----no chest pains or tightness in chest. All other systems reviewed and are negative    Objective:       Physical Exam           Physical Exam  /84   Pulse 76   Ht 170.2 cm (67\")   Wt (!) 165 kg (364 lb)   BMI 57.01 kg/m²     General appearance: NAD, conversant   Eyes: anicteric sclerae, moist conjunctivae; no lid-lag; PERRLA   HENT: Atraumatic; oropharynx clear with moist mucous membranes and no mucosal ulcerations;  normal " hard and soft palate   Neck: Trachea midline; FROM, supple, no thyromegaly or lymphadenopathy   Lungs: CTA, with normal respiratory effort and no intercostal retractions   CV: S1-S2 regular, no murmurs, no rub, no gallop   Abdomen: Soft, non-tender; no masses or HSM   Extremities: No peripheral edema or extremity lymphadenopathy  Skin: Normal temperature, turgor and texture; no rash, ulcers or subcutaneous nodules   Psych: Appropriate affect, alert and oriented to person, place and time           Cardiographics  @  ECG 12 Lead  Date/Time: 5/15/2018 10:15 AM  Performed by: PETER ELIZABETH  Authorized by: PETER ELIZABETH   Comparison: not compared with previous ECG   Previous ECG: no previous ECG available  Rhythm: sinus rhythm  ST Flattening: III and aVF  Q waves: V1, V2 and V3  Clinical impression: abnormal ECG            Echocardiogram:     Imaging  Chest x-ray: not done     Lab Review   not applicable       Current Outpatient Prescriptions:   •  atorvastatin (LIPITOR) 20 MG tablet, Take 1 tablet by mouth Daily., Disp: 90 tablet, Rfl: 3  •  Bioflavonoid Products (VITAMIN C) chewable tablet, Chew 1 tablet Daily., Disp: , Rfl:   •  Calcium Carbonate Antacid (TANA-SELTZER ANTACID PO), Take 1 tablet by mouth Daily As Needed., Disp: , Rfl:   •  Calcium Carbonate-Vitamin D 300-100 MG-UNIT capsule, Take 1 tablet by mouth Daily., Disp: , Rfl:   •  desloratadine (CLARINEX) 5 MG tablet, Take 1 tablet by mouth Daily., Disp: 90 tablet, Rfl: 3  •  fluticasone (FLONASE) 50 MCG/ACT nasal spray, 1 spray into each nostril Daily., Disp: 3 bottle, Rfl: 3  •  ibuprofen (ADVIL,MOTRIN) 200 MG tablet, Take 200 mg by mouth Every 6 (Six) Hours As Needed for Mild Pain . Pt to stop before surgery, Disp: , Rfl:   •  lisinopril-hydrochlorothiazide (PRINZIDE,ZESTORETIC) 20-12.5 MG per tablet, Take 2 tablets by mouth Daily. (Patient taking differently: Take 1 tablet by mouth 2 (Two) Times a Day.), Disp: 360 tablet, Rfl: 3  •   montelukast (SINGULAIR) 10 MG tablet, Take 1 tablet by mouth Daily., Disp: 90 tablet, Rfl: 3  •  MULTIPLE VITAMINS ESSENTIAL PO, Take 1 tablet by mouth Daily., Disp: , Rfl:         Assessment:      No diagnosis found.    Patient Active Problem List   Diagnosis   • Renal failure   • Allergic rhinitis   • Anemia due to chronic kidney disease   • Cardiac murmur   • Essential hypertension   • Morbid obesity with BMI of 50.0-59.9, adult   • Prediabetes   • Heart disease, rheumatic   • Vitamin D deficiency   • Environmental allergies   • GERD (gastroesophageal reflux disease)   • Bilateral knee pain   • Obesity   • Hyperlipidemia   • Breast cancer screening   • Dysuria   • Urinary frequency   • Urinary tract infection without hematuria   • Postmenopausal bleeding         Plan:          1. Essential hypertension  The blood pressures under control  - ECG 12 Lead  - Treadmill Stress Test  - Adult Transthoracic Echo Complete W/ Cont if Necessary Per Protocol    2. Hyperlipidemia, unspecified hyperlipidemia type  Counseling has been done  - Treadmill Stress Test  - Adult Transthoracic Echo Complete W/ Cont if Necessary Per Protocol    3. Cardiac murmur  Considering patient's medical condition as well as the risk factors, patient will require echocardiogram for further evaluation for the LV function, four-chamber evaluation, including the pressures, valvular function and  pericardial disease and pericardial effusion    - Treadmill Stress Test  - Adult Transthoracic Echo Complete W/ Cont if Necessary Per Protocol    4. Morbid obesity with BMI of 50.0-59.9, adult  Significant risk of obesity to CAD, HTN has been explained  Advantages of wt reduction has been explained    - Treadmill Stress Test  - Adult Transthoracic Echo Complete W/ Cont if Necessary Per Protocol    5. Pre-op evaluation  Considering the patient's symptoms as well as clinical situation and  EKG findings, along with cardiac risk factors, ischemic workup is  necessary to rule out ischemic cardiomyopathy, stress induced arrhythmias, and functional capacity for diagnosis as well as prognostic consideration    - Treadmill Stress Test  - Adult Transthoracic Echo Complete W/ Cont if Necessary Per Protocol    6. Abnormal EKG  Considering the patient's symptoms as well as clinical situation and  EKG findings, along with cardiac risk factors, ischemic workup is necessary to rule out ischemic cardiomyopathy, stress induced arrhythmias, and functional capacity for diagnosis as well as prognostic consideration    - Treadmill Stress Test  - Adult Transthoracic Echo Complete W/ Cont if Necessary Per Protocol      ETT, ECHO BEFORE CLEARANCE        Counseling was given to Jany Harris for the following topics:  risk factor reductions, prognosis and risks and benefits of treatment options .       SMOKING COUNSELING:    Counseling given: Not Answered      .  EMR Dragon/Transcription disclaimer:   Much of this encounter note is an electronic transcription/translation of spoken language to printed text. The electronic translation of spoken language may permit erroneous, or at times, nonsensical words or phrases to be inadvertently transcribed; Although I have reviewed the note for such errors, some may still exist.

## 2018-05-16 ENCOUNTER — HOSPITAL ENCOUNTER (OUTPATIENT)
Dept: CARDIOLOGY | Facility: HOSPITAL | Age: 58
Discharge: HOME | End: 2018-05-16
Attending: INTERNAL MEDICINE | Admitting: INTERNAL MEDICINE

## 2018-05-16 VITALS
DIASTOLIC BLOOD PRESSURE: 66 MMHG | WEIGHT: 293 LBS | BODY MASS INDEX: 45.99 KG/M2 | HEIGHT: 67 IN | SYSTOLIC BLOOD PRESSURE: 102 MMHG | HEART RATE: 80 BPM

## 2018-05-16 DIAGNOSIS — R01.1 CARDIAC MURMUR: ICD-10-CM

## 2018-05-16 DIAGNOSIS — I09.9: Primary | ICD-10-CM

## 2018-05-16 LAB
BH CV ECHO MEAS - ACS: 2.3 CM
BH CV ECHO MEAS - AO MAX PG (FULL): 3.5 MMHG
BH CV ECHO MEAS - AO MAX PG: 11.7 MMHG
BH CV ECHO MEAS - AO MEAN PG (FULL): 2 MMHG
BH CV ECHO MEAS - AO MEAN PG: 7 MMHG
BH CV ECHO MEAS - AO ROOT AREA (BSA CORRECTED): 1.3
BH CV ECHO MEAS - AO ROOT AREA: 9.1 CM^2
BH CV ECHO MEAS - AO ROOT DIAM: 3.4 CM
BH CV ECHO MEAS - AO V2 MAX: 171 CM/SEC
BH CV ECHO MEAS - AO V2 MEAN: 122 CM/SEC
BH CV ECHO MEAS - AO V2 VTI: 35.2 CM
BH CV ECHO MEAS - AVA(I,A): 3.6 CM^2
BH CV ECHO MEAS - AVA(I,D): 3.6 CM^2
BH CV ECHO MEAS - AVA(V,A): 3.5 CM^2
BH CV ECHO MEAS - AVA(V,D): 3.5 CM^2
BH CV ECHO MEAS - BSA(HAYCOCK): 2.9 M^2
BH CV ECHO MEAS - BSA: 2.6 M^2
BH CV ECHO MEAS - BZI_BMI: 57 KILOGRAMS/M^2
BH CV ECHO MEAS - BZI_METRIC_HEIGHT: 170.2 CM
BH CV ECHO MEAS - BZI_METRIC_WEIGHT: 165.1 KG
BH CV ECHO MEAS - CONTRAST EF (2CH): 55.1 ML/M^2
BH CV ECHO MEAS - CONTRAST EF 4CH: 51.1 ML/M^2
BH CV ECHO MEAS - EDV(CUBED): 54.9 ML
BH CV ECHO MEAS - EDV(MOD-SP2): 69 ML
BH CV ECHO MEAS - EDV(MOD-SP4): 92 ML
BH CV ECHO MEAS - EDV(TEICH): 62 ML
BH CV ECHO MEAS - EF(CUBED): 74.8 %
BH CV ECHO MEAS - EF(MOD-BP): 55 %
BH CV ECHO MEAS - EF(MOD-SP2): 55.1 %
BH CV ECHO MEAS - EF(MOD-SP4): 51.1 %
BH CV ECHO MEAS - EF(TEICH): 67.5 %
BH CV ECHO MEAS - ESV(CUBED): 13.8 ML
BH CV ECHO MEAS - ESV(MOD-SP2): 31 ML
BH CV ECHO MEAS - ESV(MOD-SP4): 45 ML
BH CV ECHO MEAS - ESV(TEICH): 20.2 ML
BH CV ECHO MEAS - FS: 36.8 %
BH CV ECHO MEAS - IVS/LVPW: 1
BH CV ECHO MEAS - IVSD: 1.3 CM
BH CV ECHO MEAS - LA DIMENSION: 5.3 CM
BH CV ECHO MEAS - LA/AO: 1.6
BH CV ECHO MEAS - LAT PEAK E' VEL: 10.9 CM/SEC
BH CV ECHO MEAS - LV DIASTOLIC VOL/BSA (35-75): 35.3 ML/M^2
BH CV ECHO MEAS - LV MASS(C)D: 173.1 GRAMS
BH CV ECHO MEAS - LV MASS(C)DI: 66.4 GRAMS/M^2
BH CV ECHO MEAS - LV MAX PG: 8.2 MMHG
BH CV ECHO MEAS - LV MEAN PG: 5 MMHG
BH CV ECHO MEAS - LV SYSTOLIC VOL/BSA (12-30): 17.3 ML/M^2
BH CV ECHO MEAS - LV V1 MAX: 143 CM/SEC
BH CV ECHO MEAS - LV V1 MEAN: 108 CM/SEC
BH CV ECHO MEAS - LV V1 VTI: 30.1 CM
BH CV ECHO MEAS - LVIDD: 3.8 CM
BH CV ECHO MEAS - LVIDS: 2.4 CM
BH CV ECHO MEAS - LVLD AP2: 7.6 CM
BH CV ECHO MEAS - LVLD AP4: 8.5 CM
BH CV ECHO MEAS - LVLS AP2: 6.9 CM
BH CV ECHO MEAS - LVLS AP4: 6.9 CM
BH CV ECHO MEAS - LVOT AREA (M): 4.2 CM^2
BH CV ECHO MEAS - LVOT AREA: 4.2 CM^2
BH CV ECHO MEAS - LVOT DIAM: 2.3 CM
BH CV ECHO MEAS - LVPWD: 1.3 CM
BH CV ECHO MEAS - MED PEAK E' VEL: 7.1 CM/SEC
BH CV ECHO MEAS - MV A DUR: 0.18 SEC
BH CV ECHO MEAS - MV A MAX VEL: 76.6 CM/SEC
BH CV ECHO MEAS - MV DEC SLOPE: 365 CM/SEC^2
BH CV ECHO MEAS - MV DEC TIME: 0.24 SEC
BH CV ECHO MEAS - MV E MAX VEL: 93.1 CM/SEC
BH CV ECHO MEAS - MV E/A: 1.2
BH CV ECHO MEAS - MV MAX PG: 6 MMHG
BH CV ECHO MEAS - MV MEAN PG: 3 MMHG
BH CV ECHO MEAS - MV P1/2T MAX VEL: 117 CM/SEC
BH CV ECHO MEAS - MV P1/2T: 93.9 MSEC
BH CV ECHO MEAS - MV V2 MAX: 122 CM/SEC
BH CV ECHO MEAS - MV V2 MEAN: 81.4 CM/SEC
BH CV ECHO MEAS - MV V2 VTI: 32.6 CM
BH CV ECHO MEAS - MVA P1/2T LCG: 1.9 CM^2
BH CV ECHO MEAS - MVA(P1/2T): 2.3 CM^2
BH CV ECHO MEAS - MVA(VTI): 3.8 CM^2
BH CV ECHO MEAS - PA ACC TIME: 0.07 SEC
BH CV ECHO MEAS - PA MAX PG (FULL): 5.1 MMHG
BH CV ECHO MEAS - PA MAX PG: 9.7 MMHG
BH CV ECHO MEAS - PA PR(ACCEL): 47.1 MMHG
BH CV ECHO MEAS - PA V2 MAX: 156 CM/SEC
BH CV ECHO MEAS - PULM A REVS DUR: 0.14 SEC
BH CV ECHO MEAS - PULM A REVS VEL: 25.3 CM/SEC
BH CV ECHO MEAS - PULM DIAS VEL: 36.2 CM/SEC
BH CV ECHO MEAS - PULM S/D: 1.4
BH CV ECHO MEAS - PULM SYS VEL: 50.1 CM/SEC
BH CV ECHO MEAS - PVA(V,A): 2.6 CM^2
BH CV ECHO MEAS - PVA(V,D): 2.6 CM^2
BH CV ECHO MEAS - QP/QS: 0.71
BH CV ECHO MEAS - RAP SYSTOLE: 3 MMHG
BH CV ECHO MEAS - RV MAX PG: 4.7 MMHG
BH CV ECHO MEAS - RV MEAN PG: 2 MMHG
BH CV ECHO MEAS - RV V1 MAX: 108 CM/SEC
BH CV ECHO MEAS - RV V1 MEAN: 71.5 CM/SEC
BH CV ECHO MEAS - RV V1 VTI: 23.2 CM
BH CV ECHO MEAS - RVDD: 3.1 CM
BH CV ECHO MEAS - RVOT AREA: 3.8 CM^2
BH CV ECHO MEAS - RVOT DIAM: 2.2 CM
BH CV ECHO MEAS - RVSP: 16 MMHG
BH CV ECHO MEAS - SI(AO): 122.5 ML/M^2
BH CV ECHO MEAS - SI(CUBED): 15.7 ML/M^2
BH CV ECHO MEAS - SI(LVOT): 47.9 ML/M^2
BH CV ECHO MEAS - SI(MOD-SP2): 14.6 ML/M^2
BH CV ECHO MEAS - SI(MOD-SP4): 18 ML/M^2
BH CV ECHO MEAS - SI(TEICH): 16 ML/M^2
BH CV ECHO MEAS - SV(AO): 319.6 ML
BH CV ECHO MEAS - SV(CUBED): 41 ML
BH CV ECHO MEAS - SV(LVOT): 125.1 ML
BH CV ECHO MEAS - SV(MOD-SP2): 38 ML
BH CV ECHO MEAS - SV(MOD-SP4): 47 ML
BH CV ECHO MEAS - SV(RVOT): 88.2 ML
BH CV ECHO MEAS - SV(TEICH): 41.8 ML
BH CV ECHO MEAS - TAPSE (>1.6): 2.1 CM2
BH CV ECHO MEAS - TR MAX VEL: 181 CM/SEC
BH CV ECHO MEASUREMENTS AVERAGE E/E' RATIO: 10.34
BH CV XLRA - RV BASE: 3.5 CM
BH CV XLRA - RV LENGTH: 6.9 CM
BH CV XLRA - RV MID: 2.9 CM
BH CV XLRA - TDI S': 14.4 CM/SEC
LEFT ATRIUM VOLUME INDEX: 25 ML/M2
MAXIMAL PREDICTED HEART RATE: 164 BPM
STRESS TARGET HR: 139 BPM

## 2018-05-16 PROCEDURE — 93018 CV STRESS TEST I&R ONLY: CPT | Performed by: INTERNAL MEDICINE

## 2018-05-16 PROCEDURE — 93016 CV STRESS TEST SUPVJ ONLY: CPT | Performed by: INTERNAL MEDICINE

## 2018-05-16 PROCEDURE — 93017 CV STRESS TEST TRACING ONLY: CPT

## 2018-05-16 NOTE — TELEPHONE ENCOUNTER
Considering she has a av block and I don't know if it is new or old I think a cardilogist should clear her please refer

## 2018-05-16 NOTE — TELEPHONE ENCOUNTER
Patient has already been referred to cardiology and was seen yesterday for clearance office visit is in chart

## 2018-05-17 ENCOUNTER — HOSPITAL ENCOUNTER (OUTPATIENT)
Dept: CARDIOLOGY | Facility: HOSPITAL | Age: 58
Discharge: HOME | End: 2018-05-17
Attending: INTERNAL MEDICINE

## 2018-05-17 VITALS
HEART RATE: 110 BPM | OXYGEN SATURATION: 97 % | RESPIRATION RATE: 20 BRPM | HEIGHT: 67 IN | SYSTOLIC BLOOD PRESSURE: 120 MMHG | WEIGHT: 293 LBS | BODY MASS INDEX: 45.99 KG/M2 | DIASTOLIC BLOOD PRESSURE: 68 MMHG

## 2018-05-17 DIAGNOSIS — I09.9: ICD-10-CM

## 2018-05-17 DIAGNOSIS — R01.1 CARDIAC MURMUR: ICD-10-CM

## 2018-05-17 LAB
BH CV STRESS BP STAGE 1: NORMAL
BH CV STRESS BP STAGE 1: NORMAL
BH CV STRESS DURATION MIN STAGE 1: 3
BH CV STRESS DURATION MIN STAGE 1: 3
BH CV STRESS DURATION SEC STAGE 1: 0
BH CV STRESS DURATION SEC STAGE 1: 28
BH CV STRESS GRADE STAGE 1: 10
BH CV STRESS GRADE STAGE 1: 10
BH CV STRESS HR STAGE 1: 166
BH CV STRESS HR STAGE 1: 171
BH CV STRESS METS STAGE 1: 4.4
BH CV STRESS METS STAGE 1: 4.6
BH CV STRESS PROTOCOL 1: NORMAL
BH CV STRESS PROTOCOL 1: NORMAL
BH CV STRESS RECOVERY BP: NORMAL MMHG
BH CV STRESS RECOVERY BP: NORMAL MMHG
BH CV STRESS RECOVERY HR: 104 BPM
BH CV STRESS RECOVERY HR: 99 BPM
BH CV STRESS RECOVERY O2: 97 %
BH CV STRESS SPEED STAGE 1: 1.7
BH CV STRESS SPEED STAGE 1: 1.7
BH CV STRESS STAGE 1: 1
BH CV STRESS STAGE 1: 1
LV EF NUC BP: 60 %
MAXIMAL PREDICTED HEART RATE: 164 BPM
MAXIMAL PREDICTED HEART RATE: 164 BPM
PERCENT MAX PREDICTED HR: 101.22 %
PERCENT MAX PREDICTED HR: 104.27 %
STRESS BASELINE BP: NORMAL MMHG
STRESS BASELINE BP: NORMAL MMHG
STRESS BASELINE HR: 112 BPM
STRESS BASELINE HR: 75 BPM
STRESS O2 SAT REST: 97 %
STRESS PERCENT HR: 119 %
STRESS PERCENT HR: 123 %
STRESS POST ESTIMATED WORKLOAD: 4.6 METS
STRESS POST ESTIMATED WORKLOAD: 4.6 METS
STRESS POST EXERCISE DUR MIN: 3 MIN
STRESS POST EXERCISE DUR MIN: 3 MIN
STRESS POST EXERCISE DUR SEC: 0 SEC
STRESS POST EXERCISE DUR SEC: 28 SEC
STRESS POST PEAK BP: NORMAL MMHG
STRESS POST PEAK BP: NORMAL MMHG
STRESS POST PEAK HR: 166 BPM
STRESS POST PEAK HR: 171 BPM
STRESS TARGET HR: 139 BPM
STRESS TARGET HR: 139 BPM

## 2018-05-17 PROCEDURE — 93016 CV STRESS TEST SUPVJ ONLY: CPT | Performed by: INTERNAL MEDICINE

## 2018-05-17 PROCEDURE — 78452 HT MUSCLE IMAGE SPECT MULT: CPT

## 2018-05-17 PROCEDURE — 93017 CV STRESS TEST TRACING ONLY: CPT

## 2018-05-17 PROCEDURE — 78452 HT MUSCLE IMAGE SPECT MULT: CPT | Performed by: INTERNAL MEDICINE

## 2018-05-17 PROCEDURE — 93018 CV STRESS TEST I&R ONLY: CPT | Performed by: INTERNAL MEDICINE

## 2018-05-17 PROCEDURE — A9500 TC99M SESTAMIBI: HCPCS | Performed by: INTERNAL MEDICINE

## 2018-05-17 PROCEDURE — 0 TECHNETIUM SESTAMIBI: Performed by: INTERNAL MEDICINE

## 2018-05-17 RX ADMIN — TECHNETIUM TC 99M SESTAMIBI 1 DOSE: 1 INJECTION INTRAVENOUS at 09:59

## 2018-05-17 RX ADMIN — TECHNETIUM TC 99M SESTAMIBI 1 DOSE: 1 INJECTION INTRAVENOUS at 07:32

## 2018-05-18 ENCOUNTER — ANESTHESIA (OUTPATIENT)
Dept: PERIOP | Facility: HOSPITAL | Age: 58
End: 2018-05-18

## 2018-05-18 ENCOUNTER — HOSPITAL ENCOUNTER (OUTPATIENT)
Facility: HOSPITAL | Age: 58
Setting detail: HOSPITAL OUTPATIENT SURGERY
Discharge: HOME | End: 2018-05-18
Attending: OBSTETRICS & GYNECOLOGY | Admitting: OBSTETRICS & GYNECOLOGY

## 2018-05-18 ENCOUNTER — ANESTHESIA EVENT (OUTPATIENT)
Dept: PERIOP | Facility: HOSPITAL | Age: 58
End: 2018-05-18

## 2018-05-18 VITALS
HEIGHT: 67 IN | SYSTOLIC BLOOD PRESSURE: 126 MMHG | TEMPERATURE: 97.5 F | RESPIRATION RATE: 18 BRPM | DIASTOLIC BLOOD PRESSURE: 68 MMHG | HEART RATE: 82 BPM | OXYGEN SATURATION: 98 % | BODY MASS INDEX: 45.99 KG/M2 | WEIGHT: 293 LBS

## 2018-05-18 DIAGNOSIS — N95.0 POSTMENOPAUSAL BLEEDING: ICD-10-CM

## 2018-05-18 PROCEDURE — S0260 H&P FOR SURGERY: HCPCS | Performed by: OBSTETRICS & GYNECOLOGY

## 2018-05-18 PROCEDURE — 25010000002 DEXAMETHASONE PER 1 MG: Performed by: NURSE ANESTHETIST, CERTIFIED REGISTERED

## 2018-05-18 PROCEDURE — 25010000002 PROPOFOL 10 MG/ML EMULSION: Performed by: NURSE ANESTHETIST, CERTIFIED REGISTERED

## 2018-05-18 PROCEDURE — 25010000002 ONDANSETRON PER 1 MG: Performed by: NURSE ANESTHETIST, CERTIFIED REGISTERED

## 2018-05-18 PROCEDURE — 25010000002 SUCCINYLCHOLINE PER 20 MG: Performed by: NURSE ANESTHETIST, CERTIFIED REGISTERED

## 2018-05-18 PROCEDURE — 25010000002 MIDAZOLAM PER 1 MG: Performed by: ANESTHESIOLOGY

## 2018-05-18 PROCEDURE — 25010000002 ROPIVACAINE PER 1 MG: Performed by: OBSTETRICS & GYNECOLOGY

## 2018-05-18 PROCEDURE — 88305 TISSUE EXAM BY PATHOLOGIST: CPT | Performed by: OBSTETRICS & GYNECOLOGY

## 2018-05-18 PROCEDURE — 25010000002 FENTANYL CITRATE (PF) 100 MCG/2ML SOLUTION: Performed by: NURSE ANESTHETIST, CERTIFIED REGISTERED

## 2018-05-18 PROCEDURE — 58558 HYSTEROSCOPY BIOPSY: CPT | Performed by: OBSTETRICS & GYNECOLOGY

## 2018-05-18 PROCEDURE — C1782 MORCELLATOR: HCPCS | Performed by: OBSTETRICS & GYNECOLOGY

## 2018-05-18 RX ORDER — FAMOTIDINE 10 MG/ML
20 INJECTION, SOLUTION INTRAVENOUS ONCE
Status: COMPLETED | OUTPATIENT
Start: 2018-05-18 | End: 2018-05-18

## 2018-05-18 RX ORDER — HYDROCODONE BITARTRATE AND ACETAMINOPHEN 7.5; 325 MG/1; MG/1
1 TABLET ORAL ONCE AS NEEDED
Status: DISCONTINUED | OUTPATIENT
Start: 2018-05-18 | End: 2018-05-18 | Stop reason: HOSPADM

## 2018-05-18 RX ORDER — DIPHENHYDRAMINE HYDROCHLORIDE 50 MG/ML
12.5 INJECTION INTRAMUSCULAR; INTRAVENOUS
Status: DISCONTINUED | OUTPATIENT
Start: 2018-05-18 | End: 2018-05-18 | Stop reason: HOSPADM

## 2018-05-18 RX ORDER — FENTANYL CITRATE 50 UG/ML
INJECTION, SOLUTION INTRAMUSCULAR; INTRAVENOUS AS NEEDED
Status: DISCONTINUED | OUTPATIENT
Start: 2018-05-18 | End: 2018-05-18 | Stop reason: SURG

## 2018-05-18 RX ORDER — LIDOCAINE HYDROCHLORIDE 10 MG/ML
0.5 INJECTION, SOLUTION EPIDURAL; INFILTRATION; INTRACAUDAL; PERINEURAL ONCE AS NEEDED
Status: DISCONTINUED | OUTPATIENT
Start: 2018-05-18 | End: 2018-05-18 | Stop reason: HOSPADM

## 2018-05-18 RX ORDER — FLUMAZENIL 0.1 MG/ML
0.2 INJECTION INTRAVENOUS AS NEEDED
Status: DISCONTINUED | OUTPATIENT
Start: 2018-05-18 | End: 2018-05-18 | Stop reason: HOSPADM

## 2018-05-18 RX ORDER — LABETALOL HYDROCHLORIDE 5 MG/ML
5 INJECTION, SOLUTION INTRAVENOUS
Status: DISCONTINUED | OUTPATIENT
Start: 2018-05-18 | End: 2018-05-18 | Stop reason: HOSPADM

## 2018-05-18 RX ORDER — IBUPROFEN 600 MG/1
600 TABLET ORAL EVERY 6 HOURS PRN
Qty: 20 TABLET | Refills: 0 | Status: SHIPPED | OUTPATIENT
Start: 2018-05-18

## 2018-05-18 RX ORDER — MIDAZOLAM HYDROCHLORIDE 1 MG/ML
2 INJECTION INTRAMUSCULAR; INTRAVENOUS
Status: DISCONTINUED | OUTPATIENT
Start: 2018-05-18 | End: 2018-05-18 | Stop reason: HOSPADM

## 2018-05-18 RX ORDER — PROMETHAZINE HYDROCHLORIDE 25 MG/1
25 SUPPOSITORY RECTAL ONCE AS NEEDED
Status: DISCONTINUED | OUTPATIENT
Start: 2018-05-18 | End: 2018-05-18 | Stop reason: HOSPADM

## 2018-05-18 RX ORDER — HYDROMORPHONE HCL 110MG/55ML
0.5 PATIENT CONTROLLED ANALGESIA SYRINGE INTRAVENOUS
Status: DISCONTINUED | OUTPATIENT
Start: 2018-05-18 | End: 2018-05-18 | Stop reason: HOSPADM

## 2018-05-18 RX ORDER — MIDAZOLAM HYDROCHLORIDE 1 MG/ML
1 INJECTION INTRAMUSCULAR; INTRAVENOUS
Status: DISCONTINUED | OUTPATIENT
Start: 2018-05-18 | End: 2018-05-18 | Stop reason: HOSPADM

## 2018-05-18 RX ORDER — OXYCODONE HYDROCHLORIDE AND ACETAMINOPHEN 5; 325 MG/1; MG/1
1 TABLET ORAL ONCE AS NEEDED
Status: DISCONTINUED | OUTPATIENT
Start: 2018-05-18 | End: 2018-05-18 | Stop reason: HOSPADM

## 2018-05-18 RX ORDER — SODIUM CHLORIDE, SODIUM LACTATE, POTASSIUM CHLORIDE, CALCIUM CHLORIDE 600; 310; 30; 20 MG/100ML; MG/100ML; MG/100ML; MG/100ML
9 INJECTION, SOLUTION INTRAVENOUS CONTINUOUS
Status: DISCONTINUED | OUTPATIENT
Start: 2018-05-18 | End: 2018-05-18 | Stop reason: HOSPADM

## 2018-05-18 RX ORDER — NALOXONE HCL 0.4 MG/ML
0.2 VIAL (ML) INJECTION AS NEEDED
Status: DISCONTINUED | OUTPATIENT
Start: 2018-05-18 | End: 2018-05-18 | Stop reason: HOSPADM

## 2018-05-18 RX ORDER — PROMETHAZINE HYDROCHLORIDE 25 MG/ML
12.5 INJECTION, SOLUTION INTRAMUSCULAR; INTRAVENOUS ONCE AS NEEDED
Status: DISCONTINUED | OUTPATIENT
Start: 2018-05-18 | End: 2018-05-18 | Stop reason: HOSPADM

## 2018-05-18 RX ORDER — LIDOCAINE HYDROCHLORIDE 20 MG/ML
INJECTION, SOLUTION INFILTRATION; PERINEURAL AS NEEDED
Status: DISCONTINUED | OUTPATIENT
Start: 2018-05-18 | End: 2018-05-18 | Stop reason: SURG

## 2018-05-18 RX ORDER — MAGNESIUM HYDROXIDE 1200 MG/15ML
LIQUID ORAL AS NEEDED
Status: DISCONTINUED | OUTPATIENT
Start: 2018-05-18 | End: 2018-05-18 | Stop reason: HOSPADM

## 2018-05-18 RX ORDER — OXYCODONE AND ACETAMINOPHEN 7.5; 325 MG/1; MG/1
1 TABLET ORAL ONCE AS NEEDED
Status: DISCONTINUED | OUTPATIENT
Start: 2018-05-18 | End: 2018-05-18 | Stop reason: HOSPADM

## 2018-05-18 RX ORDER — PROMETHAZINE HYDROCHLORIDE 25 MG/1
12.5 TABLET ORAL ONCE AS NEEDED
Status: DISCONTINUED | OUTPATIENT
Start: 2018-05-18 | End: 2018-05-18 | Stop reason: HOSPADM

## 2018-05-18 RX ORDER — PROPOFOL 10 MG/ML
VIAL (ML) INTRAVENOUS AS NEEDED
Status: DISCONTINUED | OUTPATIENT
Start: 2018-05-18 | End: 2018-05-18 | Stop reason: SURG

## 2018-05-18 RX ORDER — OXYCODONE HYDROCHLORIDE AND ACETAMINOPHEN 5; 325 MG/1; MG/1
1-2 TABLET ORAL EVERY 4 HOURS PRN
Qty: 20 TABLET | Refills: 0 | Status: SHIPPED | OUTPATIENT
Start: 2018-05-18

## 2018-05-18 RX ORDER — ONDANSETRON 2 MG/ML
4 INJECTION INTRAMUSCULAR; INTRAVENOUS ONCE AS NEEDED
Status: DISCONTINUED | OUTPATIENT
Start: 2018-05-18 | End: 2018-05-18 | Stop reason: HOSPADM

## 2018-05-18 RX ORDER — PROMETHAZINE HYDROCHLORIDE 25 MG/1
25 TABLET ORAL ONCE AS NEEDED
Status: DISCONTINUED | OUTPATIENT
Start: 2018-05-18 | End: 2018-05-18 | Stop reason: HOSPADM

## 2018-05-18 RX ORDER — ONDANSETRON 2 MG/ML
INJECTION INTRAMUSCULAR; INTRAVENOUS AS NEEDED
Status: DISCONTINUED | OUTPATIENT
Start: 2018-05-18 | End: 2018-05-18 | Stop reason: SURG

## 2018-05-18 RX ORDER — DEXAMETHASONE SODIUM PHOSPHATE 10 MG/ML
INJECTION INTRAMUSCULAR; INTRAVENOUS AS NEEDED
Status: DISCONTINUED | OUTPATIENT
Start: 2018-05-18 | End: 2018-05-18 | Stop reason: SURG

## 2018-05-18 RX ORDER — EPHEDRINE SULFATE 50 MG/ML
5 INJECTION, SOLUTION INTRAVENOUS ONCE AS NEEDED
Status: DISCONTINUED | OUTPATIENT
Start: 2018-05-18 | End: 2018-05-18 | Stop reason: HOSPADM

## 2018-05-18 RX ORDER — HYDRALAZINE HYDROCHLORIDE 20 MG/ML
5 INJECTION INTRAMUSCULAR; INTRAVENOUS
Status: DISCONTINUED | OUTPATIENT
Start: 2018-05-18 | End: 2018-05-18 | Stop reason: HOSPADM

## 2018-05-18 RX ORDER — ROPIVACAINE HYDROCHLORIDE 5 MG/ML
INJECTION, SOLUTION EPIDURAL; INFILTRATION; PERINEURAL AS NEEDED
Status: DISCONTINUED | OUTPATIENT
Start: 2018-05-18 | End: 2018-05-18 | Stop reason: HOSPADM

## 2018-05-18 RX ORDER — SODIUM CHLORIDE 9 MG/ML
INJECTION, SOLUTION INTRAVENOUS AS NEEDED
Status: DISCONTINUED | OUTPATIENT
Start: 2018-05-18 | End: 2018-05-18 | Stop reason: HOSPADM

## 2018-05-18 RX ORDER — FENTANYL CITRATE 50 UG/ML
50 INJECTION, SOLUTION INTRAMUSCULAR; INTRAVENOUS
Status: DISCONTINUED | OUTPATIENT
Start: 2018-05-18 | End: 2018-05-18 | Stop reason: HOSPADM

## 2018-05-18 RX ORDER — SODIUM CHLORIDE 0.9 % (FLUSH) 0.9 %
1-10 SYRINGE (ML) INJECTION AS NEEDED
Status: DISCONTINUED | OUTPATIENT
Start: 2018-05-18 | End: 2018-05-18 | Stop reason: HOSPADM

## 2018-05-18 RX ORDER — IBUPROFEN 600 MG/1
600 TABLET ORAL EVERY 6 HOURS PRN
Status: DISCONTINUED | OUTPATIENT
Start: 2018-05-18 | End: 2018-05-18 | Stop reason: HOSPADM

## 2018-05-18 RX ORDER — SUCCINYLCHOLINE CHLORIDE 20 MG/ML
INJECTION INTRAMUSCULAR; INTRAVENOUS AS NEEDED
Status: DISCONTINUED | OUTPATIENT
Start: 2018-05-18 | End: 2018-05-18 | Stop reason: SURG

## 2018-05-18 RX ADMIN — PROPOFOL 100 MG: 10 INJECTION, EMULSION INTRAVENOUS at 08:10

## 2018-05-18 RX ADMIN — ONDANSETRON 4 MG: 2 INJECTION INTRAMUSCULAR; INTRAVENOUS at 07:45

## 2018-05-18 RX ADMIN — IBUPROFEN 600 MG: 600 TABLET ORAL at 09:39

## 2018-05-18 RX ADMIN — SODIUM CHLORIDE, POTASSIUM CHLORIDE, SODIUM LACTATE AND CALCIUM CHLORIDE: 600; 310; 30; 20 INJECTION, SOLUTION INTRAVENOUS at 08:10

## 2018-05-18 RX ADMIN — LIDOCAINE HYDROCHLORIDE 100 MG: 20 INJECTION, SOLUTION INFILTRATION; PERINEURAL at 07:34

## 2018-05-18 RX ADMIN — PROPOFOL 200 MG: 10 INJECTION, EMULSION INTRAVENOUS at 07:34

## 2018-05-18 RX ADMIN — DEXAMETHASONE SODIUM PHOSPHATE 8 MG: 10 INJECTION INTRAMUSCULAR; INTRAVENOUS at 07:45

## 2018-05-18 RX ADMIN — SUCCINYLCHOLINE CHLORIDE 120 MG: 20 INJECTION, SOLUTION INTRAMUSCULAR; INTRAVENOUS; PARENTERAL at 07:34

## 2018-05-18 RX ADMIN — SODIUM CHLORIDE, POTASSIUM CHLORIDE, SODIUM LACTATE AND CALCIUM CHLORIDE 9 ML/HR: 600; 310; 30; 20 INJECTION, SOLUTION INTRAVENOUS at 07:11

## 2018-05-18 RX ADMIN — FENTANYL CITRATE 150 MCG: 50 INJECTION, SOLUTION INTRAMUSCULAR; INTRAVENOUS at 08:10

## 2018-05-18 RX ADMIN — MIDAZOLAM 2 MG: 1 INJECTION INTRAMUSCULAR; INTRAVENOUS at 07:11

## 2018-05-18 RX ADMIN — FAMOTIDINE 20 MG: 10 INJECTION, SOLUTION INTRAVENOUS at 07:11

## 2018-05-18 RX ADMIN — FENTANYL CITRATE 50 MCG: 50 INJECTION INTRAMUSCULAR; INTRAVENOUS at 09:12

## 2018-05-18 RX ADMIN — FENTANYL CITRATE 100 MCG: 50 INJECTION, SOLUTION INTRAMUSCULAR; INTRAVENOUS at 07:34

## 2018-05-18 NOTE — OP NOTE
DILATATION AND CURETTAGE HYSTEROSCOPY WITH MORCELLATOR  Procedure Report    Patient Name:  Jany Harris  YOB: 1960    Date of Surgery:  5/18/2018     Indications:  Postmenopausal bleeding and thickened lining 17 mm on US    Pre-op Diagnosis:   Postmenopausal bleeding [N95.0]       Post-Op Diagnosis Codes:     * Postmenopausal bleeding [N95.0]    Procedure/CPT® Codes:      Procedure(s):  DILATATION AND CURETTAGE HYSTEROSCOPY WITH MYOSURE    Staff:  Surgeon(s):  Malu Child MD         Anesthesia: General    Estimated Blood Loss: minimal    Implants:    Nothing was implanted during the procedure    Specimen:          ID Type Source Tests Collected by Time   A :  Tissue Endometrial Curettings TISSUE PATHOLOGY EXAM Malu Child MD 5/18/2018 0826         Findings: cavity with minimal tissue and both ostia visualized, small polyp arising from anterior fundal area, completely removed with myosure, moderate tissue with curettage.     Complications: none    Description of Procedure:     DESCRIPTION OF PROCEDURE: After informed consent was obtained, the patient was taken to the operating room where general anesthesia was induced. Her legs were placed in candy cane stirrups. She was prepped and draped in the usual sterile fashion. A  speculum was placed in the vagina. The cervix was grasped with a single-tooth tenaculum. It was dilated to admit a hysteroscope. The hysteroscope was introduced with the findings noted above. Polyp was seen.  Myosure device was placed and entire polyp was removed under direct visualization.  Hysteroscope was then removed.  Hysteroscopic fluid deficit was 50cc. Curettage was then done noting a moderate amount of tissue.  Cervical block was done with 0.5 % naropin- 10 cc. The cervix was hemostatic. All instruments were removed. All counts were correct. The patient was awoken in the operating room and taken to the recovery room in stable condition.           Malu Child MD      Date: 5/18/2018  Time: 8:39 AM

## 2018-05-18 NOTE — DISCHARGE INSTRUCTIONS
You were given Ibuprofen (pain pills) today at 9:39 AM.        Dilatation and Curettage, Care After  Refer to this sheet for the next few weeks. These instructions provide you with information on caring for yourself after your procedure. Your health care provider may also give you more specific instructions.  Your treatment has been planned according to current medical practices, but problems sometimes occur.  Call your health care provider if you have any problems or questions after your care.  ? HOME CARE INSTRUCTIONS  1. It is normal to have vaginal bleeding/abdominal cramping for the next 2 weeks.  2. Wait 1 week before returning to strenuous activity.  3. Drink enough fluids to keep your urine clear or pale yellow.  4. You may shower tomorrow.  5. Do not take a bath, go swimming or use a hot tub until your health care provider approves.  6. Only take over-the-counter or prescription medicines as directed by your health care provider.  7. Follow up with your provider as directed.    ? YOU WILL BE ON PELVIC REST FOR THE NEXT 2 WEEKS OR UNTIL SPECIFIED BY YOUR PHYSICIAN. PELVIC REST INCLUDES:  1. Avoiding long periods of standing.  2. Avoiding heavy lifting, pushing or pulling.  DO NOT lift anything heavier than 10 pounds (4.5 kg)  3. DO NOT douche, use tampons, or have sex (intercourse) for 2 weeks after the procedure.    ? SEEK MEDICAL CARE IF:    1. You have increasing cramps or pain that is not relieved with medicine.  2. You have bad smelling vaginal discharge.  3. You are having problems with any medicine.  4. You have bleeding that is heavier than a normal menstrual period (greater than 1 pad/hour) or you notice large clots.  5. You have a fever > 101.  6. You have chest pain or shortness of breath.

## 2018-05-18 NOTE — ANESTHESIA PROCEDURE NOTES
Airway  Urgency: elective    Airway not difficult    General Information and Staff    Patient location during procedure: OR  Anesthesiologist: SUNDAY MONCADA  CRNA: LA NENA PRESTON    Indications and Patient Condition  Indications for airway management: airway protection    Preoxygenated: yes  Mask difficulty assessment: 2 - vent by mask + OA or adjuvant +/- NMBA    Final Airway Details  Final airway type: endotracheal airway      Successful airway: ETT  Cuffed: yes   Successful intubation technique: direct laryngoscopy  Endotracheal tube insertion site: oral  Blade: Olson  Blade size: #2  ETT size: 7.0 mm  Cormack-Lehane Classification: grade I - full view of glottis  Placement verified by: chest auscultation and capnometry   Cuff volume (mL): 8  Number of attempts at approach: 1    Additional Comments  PreO2 with 100% O2;  FeO2 >85%;  sniff position; easy mask ventilation;  Intubated with no difficultly after eyes taped; Appears atraumatic;  Lips and teeth intact as preop condition;  Airway secured. Connected to ventilator.

## 2018-05-18 NOTE — ANESTHESIA POSTPROCEDURE EVALUATION
Patient: Jany Harris    Procedure Summary     Date:  05/18/18 Room / Location:  [facility] OR  / [facility] MAIN OR    Anesthesia Start:  0730 Anesthesia Stop:  0843    Procedure:  DILATATION AND CURETTAGE HYSTEROSCOPY WITH MYOSURE (N/A Uterus) Diagnosis:       Postmenopausal bleeding      (Postmenopausal bleeding [N95.0])    Surgeon:  Malu Child MD Provider:  Shima Davidson MD    Anesthesia Type:  general ASA Status:  3          Anesthesia Type: general  Last vitals  BP   126/68 (05/18/18 1000)   Temp   36.4 °C (97.5 °F) (05/18/18 0930)   Pulse   82 (05/18/18 1000)   Resp   18 (05/18/18 1000)     SpO2   98 % (05/18/18 1000)     Post Anesthesia Care and Evaluation    Patient location during evaluation: PACU  Patient participation: complete - patient participated  Level of consciousness: awake and alert  Pain management: adequate  Airway patency: patent  Anesthetic complications: No anesthetic complications    Cardiovascular status: acceptable  Respiratory status: acceptable  Hydration status: acceptable

## 2018-05-18 NOTE — H&P
Patient Care Team:  BALJEET Darby as PCP - General (Family Medicine)    Chief complaint post-menopausal bleeding    Subjective     Patient is a 57 y.o. female presents with pink tinged discharge.  This has only been going on a few months.  No bright red bleeding.  US in office showed thickened lining of 17 mm.  Endometrial biopsy was unsuccessful. Stenotic cervix and pt too uncomfortable for more manipulation.  Recommend D&C with [facility] [place], [place].  She had an abnormal preoperative EKG but has obtained cardiac clearance this week. She initially had some pelvic pain but this resolved.    Review of Systems   Pertinent items are noted in HPI  no chest pain or SOB    History  Past Medical History:   Diagnosis Date   • Arthritis    • Carpal tunnel syndrome    • Edema    • Fatigue    • Heart murmur    • Heartburn    • Hyperlipidemia    • Hypertension    • Irregular heart beat    • Joint pain    • Plantar fasciitis    • Post-menopausal bleeding    • Postmenopausal    • Rheumatic fever     @9- had ECHO that was ok in the past few years   • UTI (urinary tract infection)     recent   • Vitamin D deficiency      Past Surgical History:   Procedure Laterality Date   • ENDOSCOPY     • NASAL POLYP EXCISION      no general anesthesia    • TONSILLECTOMY       Family History   Problem Relation Age of Onset   • Obesity Father    • Heart attack Father    • Heart disease Father    • Diabetes Brother    • Sleep apnea Brother    • Malig Hyperthermia Neg Hx    • Hypertension Neg Hx    • Hyperlipidemia Neg Hx    • Heart failure Neg Hx      Social History   Substance Use Topics   • Smoking status: Never Smoker   • Smokeless tobacco: Never Used   • Alcohol use Yes      Comment: monthly 5      Prescriptions Prior to Admission   Medication Sig Dispense Refill Last Dose   • atorvastatin (LIPITOR) 20 MG tablet Take 1 tablet by mouth Daily. 90 tablet 3 5/17/2018 at 0600   • Bioflavonoid Products (VITAMIN C) chewable tablet Chew 1 tablet Daily.    5/17/2018 at 2000   • Calcium Carbonate Antacid (TANA-SELTZER ANTACID PO) Take 1 tablet by mouth Daily As Needed.   5/17/2018 at 0600   • Calcium Carbonate-Vitamin D 300-100 MG-UNIT capsule Take 1 tablet by mouth Daily.   5/17/2018 at 0600   • desloratadine (CLARINEX) 5 MG tablet Take 1 tablet by mouth Daily. 90 tablet 3 5/17/2018 at 0600   • fluticasone (FLONASE) 50 MCG/ACT nasal spray 1 spray into each nostril Daily. 3 bottle 3 5/17/2018 at 0600   • lisinopril-hydrochlorothiazide (PRINZIDE,ZESTORETIC) 20-12.5 MG per tablet Take 2 tablets by mouth Daily. (Patient taking differently: Take 1 tablet by mouth 2 (Two) Times a Day.) 360 tablet 3 5/17/2018 at 2000   • montelukast (SINGULAIR) 10 MG tablet Take 1 tablet by mouth Daily. 90 tablet 3 5/17/2018 at 0600   • MULTIPLE VITAMINS ESSENTIAL PO Take 1 tablet by mouth Daily.   5/17/2018 at 0600   • ibuprofen (ADVIL,MOTRIN) 200 MG tablet Take 200 mg by mouth Every 6 (Six) Hours As Needed for Mild Pain . Pt to stop before surgery   5/10/2018     Allergies:  Sulfa antibiotics    Objective     Vital Signs  Temp:  [98.5 °F (36.9 °C)] 98.5 °F (36.9 °C)  Heart Rate:  [] 90  Resp:  [18-20] 18  BP: (120-129)/(68) 129/68    Physical Exam:    General Appearance: alert, appears stated age, cooperative and morbidly obese  Neck: no adenopathy, suppple, trachea midline and no thyromegaly  Lungs: respirations regular, respirations even and respirations unlabored  Heart: regular rhythm & normal rate  Abdomen: normal bowel sounds, no masses, soft non-tender, no guarding and no rebound tenderness  Pelvic: cervix normal in appearance, external genitalia normal, no adnexal masses or tenderness, no cervical motion tenderness and uterus normal in size, shape, and consistency  Extremities: moves extremities well, no edema, no cyanosis, no redness and no tenderness  Skin: no bleeding, bruising or rash  Neurologic: Mental Status orientated to person, place, time and situation, Motor  strength is equal in upper and lower extremitits  Psych: normal    Results Review:    I reviewed the patient's new clinical results.    Assessment/Plan     Principal Problem:    Postmenopausal bleeding      Plan is D&C, HSC and possible myosure if polyp present.    I discussed the patients findings and my recommendations with patient and family.   Discussed with pt morning of surgery, no changes.     Malu Child MD  05/18/18  6:21 AM    Time: 30 minutes

## 2018-05-21 LAB
CYTO UR: NORMAL
LAB AP CASE REPORT: NORMAL
LAB AP INTRADEPARTMENTAL CONSULT: NORMAL
Lab: NORMAL
PATH REPORT.FINAL DX SPEC: NORMAL
PATH REPORT.GROSS SPEC: NORMAL

## 2018-06-01 ENCOUNTER — OFFICE VISIT (OUTPATIENT)
Dept: OBSTETRICS AND GYNECOLOGY | Age: 58
End: 2018-06-01

## 2018-06-01 VITALS
BODY MASS INDEX: 48.82 KG/M2 | WEIGHT: 293 LBS | DIASTOLIC BLOOD PRESSURE: 80 MMHG | HEIGHT: 65 IN | SYSTOLIC BLOOD PRESSURE: 128 MMHG

## 2018-06-01 DIAGNOSIS — Z98.890 POST-OPERATIVE STATE: Primary | ICD-10-CM

## 2018-06-01 PROCEDURE — 99213 OFFICE O/P EST LOW 20 MIN: CPT | Performed by: OBSTETRICS & GYNECOLOGY

## 2018-06-01 NOTE — PROGRESS NOTES
GYN Visit    2018    Patient: Jany Harris          MR#:[id number]      Chief Complaint   Patient presents with   • Post-op     PT HERE FOR ROUTINE 2 WEEK PO CHECK, SHE IS WELL. MOVING TO [place] NEXT WEEK!       History of Present Illness    57 y.o. female  who presents for  Post op check  Did fine, some cramping when first woke up then no issues  Reviewed benign path and polyp found  Pt is UTD on everything  Pap   mammo   dexa - very mild ostopenia in one hip   cologard neg        No LMP recorded. Patient is postmenopausal.    ________________________________________  Patient Active Problem List   Diagnosis   • Renal failure   • Allergic rhinitis   • Anemia due to chronic kidney disease   • Cardiac murmur   • Essential hypertension   • Morbid obesity with BMI of 50.0-59.9, adult   • Prediabetes   • Heart disease, rheumatic   • Vitamin D deficiency   • Pre-op evaluation   • Environmental allergies   • GERD (gastroesophageal reflux disease)   • Bilateral knee pain   • Obesity   • Hyperlipidemia   • Breast cancer screening   • Dysuria   • Urinary frequency   • Urinary tract infection without hematuria   • Postmenopausal bleeding       Past Medical History:   Diagnosis Date   • Arthritis    • Carpal tunnel syndrome    • Edema    • Fatigue    • Heart murmur    • Heartburn    • Hyperlipidemia    • Hypertension    • Irregular heart beat    • Joint pain    • Plantar fasciitis    • Post-menopausal bleeding    • Postmenopausal    • Rheumatic fever     @9- had ECHO that was ok in the past few years   • UTI (urinary tract infection)     recent   • Vitamin D deficiency        Past Surgical History:   Procedure Laterality Date   • D&C HYSTEROSCOPY N/A 2018    Procedure: DILATATION AND CURETTAGE HYSTEROSCOPY WITH MYOSURE;  Surgeon: Malu Child MD;  Location: [facility] OR;  Service: Obstetrics/Gynecology   • ENDOSCOPY     • NASAL POLYP EXCISION      no general anesthesia    •  "TONSILLECTOMY         History   Smoking Status   • Never Smoker   Smokeless Tobacco   • Never Used       has a current medication list which includes the following prescription(s): atorvastatin, vitamin c, calcium carbonate antacid, calcium carbonate-vitamin d, desloratadine, fluticasone, ibuprofen, ibuprofen, lisinopril-hydrochlorothiazide, montelukast, multiple vitamin, and oxycodone-acetaminophen.  ________________________________________    Current contraception: post menopausal status      The following portions of the patient's history were reviewed and updated as appropriate: allergies, current medications, past family history, past medical history, past social history, past surgical history and problem list.    Review of Systems    Pertinent items are noted in HPI.     Objective   Physical Exam    /80   Ht 165.1 cm (65\")   Wt (!) 162 kg (358 lb)   BMI 59.57 kg/m²    BP Readings from Last 3 Encounters:   06/01/18 128/80   05/18/18 126/68   05/17/18 120/68      Wt Readings from Last 3 Encounters:   06/01/18 (!) 162 kg (358 lb)   05/18/18 (!) 165 kg (364 lb 4 oz)   05/17/18 (!) 164 kg (362 lb)      BMI: Estimated body mass index is 59.57 kg/m² as calculated from the following:    Height as of this encounter: 165.1 cm (65\").    Weight as of this encounter: 162 kg (358 lb).    Lungs: non labored breathing  General:   alert, appears stated age and cooperative                                 Assessment:      Jany was seen today for post-op.    Diagnoses and all orders for this visit:    Post-operative state      Pt moving and will get records  Gave copy of path report and OP note      "

## (undated) DEVICE — NDL SPINE 20G 3 1/2 YEL STRL 1P/U

## (undated) DEVICE — ST TBG ENDOMAT HYSTSCPY

## (undated) DEVICE — SEAL HYSTERSCOPE/OUTFLOW CHANNEL MYOSURE

## (undated) DEVICE — GLV SURG BIOGEL M LTX PF 6 1/2

## (undated) DEVICE — PAD SANI MAXI W/ADHS SNG WRP 11IN

## (undated) DEVICE — STRAP STIRUP SLP RNG 19X3.5IN DISP

## (undated) DEVICE — GLV SURG TRIUMPH CLASSIC PF LTX 6.5 STRL

## (undated) DEVICE — LOU D & C HYSTEROSCOPY: Brand: MEDLINE INDUSTRIES, INC.

## (undated) DEVICE — DEV TISS REMOV MYOSURE/LITE HYSTEROSCP

## (undated) DEVICE — SOL IRR NACL 0.9PCT 3000ML